# Patient Record
Sex: MALE
[De-identification: names, ages, dates, MRNs, and addresses within clinical notes are randomized per-mention and may not be internally consistent; named-entity substitution may affect disease eponyms.]

---

## 2017-03-24 ENCOUNTER — HOSPITAL (OUTPATIENT)
Dept: OTHER | Age: 57
End: 2017-03-24
Attending: PODIATRIST

## 2017-03-24 ENCOUNTER — CHARTING TRANS (OUTPATIENT)
Dept: OTHER | Age: 57
End: 2017-03-24

## 2017-03-24 LAB — GLUCOSE BLDC GLUCOMTR-MCNC: 134 MG/DL (ref 65–99)

## 2017-04-01 ENCOUNTER — HOSPITAL (OUTPATIENT)
Dept: OTHER | Age: 57
End: 2017-04-01
Attending: PODIATRIST

## 2017-04-26 LAB — GLUCOSE BLDC GLUCOMTR-MCNC: 199 MG/DL (ref 65–99)

## 2017-05-01 ENCOUNTER — HOSPITAL (OUTPATIENT)
Dept: OTHER | Age: 57
End: 2017-05-01
Attending: PODIATRIST

## 2017-05-22 ENCOUNTER — HOSPITAL ENCOUNTER (INPATIENT)
Facility: HOSPITAL | Age: 57
LOS: 4 days | Discharge: HOME HEALTH | DRG: 485 | End: 2017-05-26
Attending: EMERGENCY MEDICINE | Admitting: INTERNAL MEDICINE
Payer: COMMERCIAL

## 2017-05-22 ENCOUNTER — APPOINTMENT (OUTPATIENT)
Dept: GENERAL RADIOLOGY | Facility: HOSPITAL | Age: 57
DRG: 485 | End: 2017-05-22
Attending: EMERGENCY MEDICINE
Payer: COMMERCIAL

## 2017-05-22 DIAGNOSIS — L03.116 LEFT LEG CELLULITIS: Primary | ICD-10-CM

## 2017-05-22 DIAGNOSIS — E11.65 TYPE 2 DIABETES MELLITUS WITH HYPERGLYCEMIA, UNSPECIFIED LONG TERM INSULIN USE STATUS: ICD-10-CM

## 2017-05-22 DIAGNOSIS — N28.9 RENAL INSUFFICIENCY: ICD-10-CM

## 2017-05-22 PROCEDURE — 93005 ELECTROCARDIOGRAM TRACING: CPT

## 2017-05-22 PROCEDURE — 87086 URINE CULTURE/COLONY COUNT: CPT | Performed by: EMERGENCY MEDICINE

## 2017-05-22 PROCEDURE — 87040 BLOOD CULTURE FOR BACTERIA: CPT | Performed by: EMERGENCY MEDICINE

## 2017-05-22 PROCEDURE — 99285 EMERGENCY DEPT VISIT HI MDM: CPT

## 2017-05-22 PROCEDURE — 81001 URINALYSIS AUTO W/SCOPE: CPT | Performed by: EMERGENCY MEDICINE

## 2017-05-22 PROCEDURE — 36415 COLL VENOUS BLD VENIPUNCTURE: CPT

## 2017-05-22 PROCEDURE — 83690 ASSAY OF LIPASE: CPT | Performed by: EMERGENCY MEDICINE

## 2017-05-22 PROCEDURE — 71010 XR CHEST AP PORTABLE  (CPT=71010): CPT | Performed by: EMERGENCY MEDICINE

## 2017-05-22 PROCEDURE — 87070 CULTURE OTHR SPECIMN AEROBIC: CPT | Performed by: ORTHOPAEDIC SURGERY

## 2017-05-22 PROCEDURE — 93010 ELECTROCARDIOGRAM REPORT: CPT

## 2017-05-22 PROCEDURE — 87205 SMEAR GRAM STAIN: CPT | Performed by: ORTHOPAEDIC SURGERY

## 2017-05-22 PROCEDURE — 80053 COMPREHEN METABOLIC PANEL: CPT | Performed by: EMERGENCY MEDICINE

## 2017-05-22 PROCEDURE — 82962 GLUCOSE BLOOD TEST: CPT

## 2017-05-22 PROCEDURE — 87150 DNA/RNA AMPLIFIED PROBE: CPT | Performed by: EMERGENCY MEDICINE

## 2017-05-22 PROCEDURE — 73562 X-RAY EXAM OF KNEE 3: CPT | Performed by: EMERGENCY MEDICINE

## 2017-05-22 PROCEDURE — 85025 COMPLETE CBC W/AUTO DIFF WBC: CPT | Performed by: EMERGENCY MEDICINE

## 2017-05-22 PROCEDURE — 96361 HYDRATE IV INFUSION ADD-ON: CPT

## 2017-05-22 PROCEDURE — 94660 CPAP INITIATION&MGMT: CPT

## 2017-05-22 PROCEDURE — 87184 SC STD DISK METHOD PER PLATE: CPT | Performed by: EMERGENCY MEDICINE

## 2017-05-22 PROCEDURE — 5A09357 ASSISTANCE WITH RESPIRATORY VENTILATION, LESS THAN 24 CONSECUTIVE HOURS, CONTINUOUS POSITIVE AIRWAY PRESSURE: ICD-10-PCS | Performed by: HOSPITALIST

## 2017-05-22 PROCEDURE — 87184 SC STD DISK METHOD PER PLATE: CPT | Performed by: ORTHOPAEDIC SURGERY

## 2017-05-22 PROCEDURE — 96374 THER/PROPH/DIAG INJ IV PUSH: CPT

## 2017-05-22 PROCEDURE — 87147 CULTURE TYPE IMMUNOLOGIC: CPT | Performed by: EMERGENCY MEDICINE

## 2017-05-22 PROCEDURE — 87186 SC STD MICRODIL/AGAR DIL: CPT | Performed by: ORTHOPAEDIC SURGERY

## 2017-05-22 PROCEDURE — 89050 BODY FLUID CELL COUNT: CPT | Performed by: ORTHOPAEDIC SURGERY

## 2017-05-22 PROCEDURE — 87147 CULTURE TYPE IMMUNOLOGIC: CPT | Performed by: ORTHOPAEDIC SURGERY

## 2017-05-22 PROCEDURE — 87186 SC STD MICRODIL/AGAR DIL: CPT | Performed by: EMERGENCY MEDICINE

## 2017-05-22 PROCEDURE — 0S9D3ZX DRAINAGE OF LEFT KNEE JOINT, PERCUTANEOUS APPROACH, DIAGNOSTIC: ICD-10-PCS | Performed by: ORTHOPAEDIC SURGERY

## 2017-05-22 PROCEDURE — 89051 BODY FLUID CELL COUNT: CPT | Performed by: ORTHOPAEDIC SURGERY

## 2017-05-22 PROCEDURE — 89060 EXAM SYNOVIAL FLUID CRYSTALS: CPT | Performed by: ORTHOPAEDIC SURGERY

## 2017-05-22 PROCEDURE — 83036 HEMOGLOBIN GLYCOSYLATED A1C: CPT | Performed by: INTERNAL MEDICINE

## 2017-05-22 RX ORDER — ATORVASTATIN CALCIUM 20 MG/1
20 TABLET, FILM COATED ORAL DAILY
Status: DISCONTINUED | OUTPATIENT
Start: 2017-05-22 | End: 2017-05-26

## 2017-05-22 RX ORDER — ACETAMINOPHEN 500 MG
1000 TABLET ORAL ONCE
Status: COMPLETED | OUTPATIENT
Start: 2017-05-22 | End: 2017-05-22

## 2017-05-22 RX ORDER — HYDROMORPHONE HYDROCHLORIDE 1 MG/ML
1 INJECTION, SOLUTION INTRAMUSCULAR; INTRAVENOUS; SUBCUTANEOUS EVERY 30 MIN PRN
Status: COMPLETED | OUTPATIENT
Start: 2017-05-22 | End: 2017-05-23

## 2017-05-22 RX ORDER — ONDANSETRON 2 MG/ML
4 INJECTION INTRAMUSCULAR; INTRAVENOUS
Status: DISCONTINUED | OUTPATIENT
Start: 2017-05-22 | End: 2017-05-24 | Stop reason: DRUGHIGH

## 2017-05-22 RX ORDER — ACETAMINOPHEN 325 MG/1
650 TABLET ORAL EVERY 6 HOURS PRN
Status: DISCONTINUED | OUTPATIENT
Start: 2017-05-22 | End: 2017-05-25

## 2017-05-22 RX ORDER — HYDROMORPHONE HYDROCHLORIDE 1 MG/ML
0.5 INJECTION, SOLUTION INTRAMUSCULAR; INTRAVENOUS; SUBCUTANEOUS EVERY 30 MIN PRN
Status: DISCONTINUED | OUTPATIENT
Start: 2017-05-22 | End: 2017-05-22 | Stop reason: ALTCHOICE

## 2017-05-22 RX ORDER — DEXTROSE MONOHYDRATE 25 G/50ML
50 INJECTION, SOLUTION INTRAVENOUS
Status: DISCONTINUED | OUTPATIENT
Start: 2017-05-22 | End: 2017-05-26

## 2017-05-22 RX ORDER — HYDROCODONE BITARTRATE AND ACETAMINOPHEN 5; 325 MG/1; MG/1
1 TABLET ORAL EVERY 6 HOURS PRN
Status: DISCONTINUED | OUTPATIENT
Start: 2017-05-22 | End: 2017-05-26

## 2017-05-22 RX ORDER — ONDANSETRON 2 MG/ML
4 INJECTION INTRAMUSCULAR; INTRAVENOUS EVERY 4 HOURS PRN
Status: DISCONTINUED | OUTPATIENT
Start: 2017-05-22 | End: 2017-05-22 | Stop reason: ALTCHOICE

## 2017-05-22 NOTE — ED PROVIDER NOTES
Patient Seen in: [facility] Emergency Department    History   Patient presents with:  Lower Extremity Injury (musculoskeletal)  Abdomen/Flank Pain (GI/)  Nausea/Vomiting/Diarrhea (gastrointestinal)    Stated Complaint: knee pain and abd pain    HP Emulsion 0.05 % External Foam,  Apply bid to rash areas. Avoid face and scalp. Insulin Glargine (LANTUS SOLOSTAR) 100 UNIT/ML Subcutaneous Solution Pen-injector,  Inject 35 Units into the skin daily.      Aspirin (ASPIR-81 OR),  Take 1 tablet by mouth da Temp src 05/22/17 1332 Temporal   SpO2 05/22/17 1332 95 %   O2 Device 05/22/17 1332 None (Room air)       Current:/63 mmHg  Pulse 106  Temp(Src) 99.9 °F (37.7 °C) (Oral)  Resp 24  Ht 198.1 cm (6' 6\")  Wt 204. 119 kg  BMI 52.01 kg/m2  SpO2 96% components within normal limits   CBC W/ DIFFERENTIAL - Abnormal; Notable for the following:     HGB 12.1 (*)     HCT 36.9 (*)     MCV 79.0 (*)     MCH 25.9 (*)     Neutrophil Absolute Prelim 10.07 (*)     Neutrophil Absolute 10.07 (*)     Lymphocyte Absol with Tylenol. Blood cultures ordered. I discussed case with infectious disease, Dr. Haylie Mendoza. She recommends Rocephin and vancomycin be started after joint aspirate obtained  I discussed case with orthopedics, Dr. Royce Sen.   He will begin later to perfor

## 2017-05-22 NOTE — ED INITIAL ASSESSMENT (HPI)
sts that he woke this AM with left knee pain. Noted swelling. sts that he cannot walk on it.   Also co abd pain with N/V/D x 2 days

## 2017-05-22 NOTE — ED NOTES
Reddened, hot area below l knee into shin, pt denies injury, worsening over past 2-3 days, pt c/o 9/10 pain to the area, cms intact, no n/t, +l pedal pulse

## 2017-05-22 NOTE — H&P
[facility] Hospitalist History and Physical      Patient presents with:  Lower Extremity Injury (musculoskeletal)  Abdomen/Flank Pain (GI/)  Nausea/Vomiting/Diarrhea (gastrointestinal)       PCP: Alfrieda Cranker, MD       History of Present Illness: Patient is a 61 Glargine (LANTUS SOLOSTAR) 100 UNIT/ML Subcutaneous Solution Pen-injector Inject 35 Units into the skin daily. NOVOTWIST 32G X 5 MM Does not apply Misc USE 2 TIMES DAILY   Aspirin (ASPIR-81 OR) Take 1 tablet by mouth daily.    TOPICORT SPRAY 0.25 % Appl Regular rate and rhythm, S1, S2 normal, no murmur, rub or gallop appreciated   Abdomen:   Soft, non-tender. Bowel sounds normal. No masses,  No organomegaly. Non distended   Extremities: Extremities normal, atraumatic, no cyanosis or edema.    Skin: Skin co 5/22/2017 at 14:42     Approved by: Richard Rothman MD            Xr Chest Ap Portable  (cpt=71010)    5/22/2017  PROCEDURE:  XR CHEST AP PORTABLE (CPT=71010)  TECHNIQUE:  AP chest radiograph was obtained. COMPARISON:  None.   INDICATIONS:  knee pain and

## 2017-05-23 ENCOUNTER — APPOINTMENT (OUTPATIENT)
Dept: CV DIAGNOSTICS | Facility: HOSPITAL | Age: 57
DRG: 485 | End: 2017-05-23
Attending: INTERNAL MEDICINE
Payer: COMMERCIAL

## 2017-05-23 PROCEDURE — 82565 ASSAY OF CREATININE: CPT | Performed by: INTERNAL MEDICINE

## 2017-05-23 PROCEDURE — 93306 TTE W/DOPPLER COMPLETE: CPT | Performed by: INTERNAL MEDICINE

## 2017-05-23 PROCEDURE — 84520 ASSAY OF UREA NITROGEN: CPT | Performed by: INTERNAL MEDICINE

## 2017-05-23 PROCEDURE — 82962 GLUCOSE BLOOD TEST: CPT

## 2017-05-23 PROCEDURE — 97530 THERAPEUTIC ACTIVITIES: CPT

## 2017-05-23 PROCEDURE — 97162 PT EVAL MOD COMPLEX 30 MIN: CPT

## 2017-05-23 PROCEDURE — 87040 BLOOD CULTURE FOR BACTERIA: CPT | Performed by: INTERNAL MEDICINE

## 2017-05-23 RX ORDER — CALCIUM CARBONATE 200(500)MG
500 TABLET,CHEWABLE ORAL
Status: DISCONTINUED | OUTPATIENT
Start: 2017-05-23 | End: 2017-05-26

## 2017-05-23 NOTE — RESPIRATORY THERAPY NOTE
SCARLET - Equipment Use Daily Summary:                  . Set Mode: AUTO CPAP WITH C-FLEX                . Usage in hours: 7.2                . 90% Pressure (EPAP) level: 13                . 90% Insp. Pressure (IPAP): Slater August AHI: 0.3                .

## 2017-05-23 NOTE — HOME CARE LIAISON
Referral received for [facility]. Referral placed via [facility]. [facility] has accepted pt and will provide skilled nurse and physical therapy. Summary:  Results for Issac Franks (MRN [id number])   Ref.  Range 5/22/2017 14:16   HEMOGLOBIN A1c Latest Ref Range: <5.7

## 2017-05-23 NOTE — PAYOR COMM NOTE
Attending Physician: Daphnie Hurd MD    Review Type: ADMISSION   Reviewer: Janey Naik       Date: May 23, 2017 - 2:48 PM  Payor: ANTELMO MELINDA  Authorization Number: [id number]  Admit date: 5/22/2017  1:35 PM   Admitted from Emergency Dept.: yes CefTRIAXone Sodium (ROCEPHIN) 2 g in sodium chloride 0.9 % 100 mL IVPB-minibag     Date Action Dose Route User    5/22/2017 2043 New Bag 2 g Intravenous Sierra Youngblood RN      HYDROcodone-acetaminophen (NORCO) 5-325 MG per tab 1 tablet     Date Action Joel Thakur RN          RESULTS LAST 24HRS:  Labs Reviewed   COMP METABOLIC PANEL (14) - Abnormal; Notable for the following:     Glucose 181 (*)     Creatinine 1.50 (*)     GFR 51 (*)     Albumin 3.2 (*)     All other components within normal limits for the following: Body Fluid Culture Result   (*)     Value: Moderate Streptococcus agalactiae (Group B beta strep)    All other components within normal limits   CBC W/ DIFFERENTIAL - Abnormal; Notable for the following:     HGB 12.1 (*)     HCT 36. 9 salvage reasonable  Long term ivabx >=6 weeks, followed by several months/years oral supression    PCN G 4 mil U q4h  DC vanc-ceftriaxone  Repeat blood cx for clearance/ECHO  I/D per ortho    PICC line in a couple days if repeat cx cleared  Discussed the p

## 2017-05-23 NOTE — CM/SW NOTE
05/23/17 1500   CM/SW Referral Data   Referral Source    Reason for Referral Discharge planning   Informant Patient   Pertinent Medical Hx   Primary Care Physician Name Errol Cushing   Patient Info   Patient's Mental Status Alert;Oriented   Pa

## 2017-05-23 NOTE — PROGRESS NOTES
[facility] Hospitalist Progress Note                                                                   [facility]    Beni Mckay  3/25/1960    SUBJECTIVE:  Febrile to 102.1 knee pain persists.       OBJECTI ([address]      Acute septic arthritis L knee, prosthetic knee  - w associated cellulitis  - plan possible I and D, IV abx as per ID, will need long term IV abx  - cheching ECHO, repeat BC    DM2  - cont home regimen    SCARLET  - cpap    Morbid obesity  - needs on

## 2017-05-23 NOTE — PROGRESS NOTES
[facility] dosing service    Initial Pharmacokinetic Consult for Vancomycin Dosing     Beni Mckay is a 61year old male admitted on 5/22 who is being treated for cellulitis. Pharmacy has been asked to dose Vancomycin by Dr. Jose Root.     He has No Kn We appreciate the opportunity to assist in his care.     Doug Shen, PharmD  5/22/2017  8:44 PM  [address] [place] [address]: [phone]

## 2017-05-23 NOTE — CONSULTS
[facility]    PATIENT'S NAME: Hammad Allen   ATTENDING PHYSICIAN: Delfin Agosto M.D.   CONSULTING PHYSICIAN: Carlos Aguilar M.D.    PATIENT ACCOUNT#:   [de-identified]    LOCATION:  [facility]  MEDICAL RECORD #:   [id number]       DATE OF BIR view.  No evidence of gross loosening. IMPRESSION:    1. Left knee pain with history of total knee arthroplasty done elsewhere, rule out septic joint. 2.   Patella subluxation, possible ligamentous issues at the left total knee.     PLAN:  Discussed

## 2017-05-23 NOTE — PHYSICAL THERAPY NOTE
PHYSICAL THERAPY EVALUATION - INPATIENT     Room Number: 385/385-A  Evaluation Date: 5/23/2017  Type of Evaluation: Initial  Physician Order: PT Eval and Treat    Presenting Problem: intractable L knee pain  Reason for Therapy: Mobility Dysfunction and lower extremity                PAIN ASSESSMENT  Ratin  Location: L knee  Management Techniques: Activity promotion; Body mechanics; Relaxation;Breathing techniques;Repositioning    COGNITION  · Overall Cognitive Status:  WFL - within functional limits supine. Updated pt on role of PT, POC, DC planning/recs, positioning.,activity.        Exercise/Education Provided:  Bed mobility  Functional activity tolerated  Transfer training    Patient End of Session: In bed;Needs met;Call light within reach;RN aware supervision     Goal #3 Patient is able to ambulate 150 feet with assist device: walker - rolling at assistance level: supervision     Goal #4 Assess stairs as appropriate    Goal #5    Goal #6    Goal Comments: Goals established on 5/23/2017

## 2017-05-24 ENCOUNTER — SURGERY (OUTPATIENT)
Age: 57
End: 2017-05-24

## 2017-05-24 ENCOUNTER — ANESTHESIA EVENT (OUTPATIENT)
Dept: SURGERY | Facility: HOSPITAL | Age: 57
DRG: 485 | End: 2017-05-24
Payer: COMMERCIAL

## 2017-05-24 ENCOUNTER — ANESTHESIA (OUTPATIENT)
Dept: SURGERY | Facility: HOSPITAL | Age: 57
DRG: 485 | End: 2017-05-24
Payer: COMMERCIAL

## 2017-05-24 PROCEDURE — 80048 BASIC METABOLIC PNL TOTAL CA: CPT | Performed by: INTERNAL MEDICINE

## 2017-05-24 PROCEDURE — 87206 SMEAR FLUORESCENT/ACID STAI: CPT | Performed by: ORTHOPAEDIC SURGERY

## 2017-05-24 PROCEDURE — 0SUW09Z SUPPLEMENT LEFT KNEE JOINT, TIBIAL SURFACE WITH LINER, OPEN APPROACH: ICD-10-PCS | Performed by: ORTHOPAEDIC SURGERY

## 2017-05-24 PROCEDURE — 82962 GLUCOSE BLOOD TEST: CPT

## 2017-05-24 PROCEDURE — 0SPD09Z REMOVAL OF LINER FROM LEFT KNEE JOINT, OPEN APPROACH: ICD-10-PCS | Performed by: ORTHOPAEDIC SURGERY

## 2017-05-24 PROCEDURE — 87102 FUNGUS ISOLATION CULTURE: CPT | Performed by: ORTHOPAEDIC SURGERY

## 2017-05-24 PROCEDURE — 97530 THERAPEUTIC ACTIVITIES: CPT

## 2017-05-24 PROCEDURE — 87116 MYCOBACTERIA CULTURE: CPT | Performed by: ORTHOPAEDIC SURGERY

## 2017-05-24 PROCEDURE — 87040 BLOOD CULTURE FOR BACTERIA: CPT | Performed by: INTERNAL MEDICINE

## 2017-05-24 PROCEDURE — 0SBD0ZZ EXCISION OF LEFT KNEE JOINT, OPEN APPROACH: ICD-10-PCS | Performed by: ORTHOPAEDIC SURGERY

## 2017-05-24 PROCEDURE — 0S9D00Z DRAINAGE OF LEFT KNEE JOINT WITH DRAINAGE DEVICE, OPEN APPROACH: ICD-10-PCS | Performed by: ORTHOPAEDIC SURGERY

## 2017-05-24 PROCEDURE — 87070 CULTURE OTHR SPECIMN AEROBIC: CPT | Performed by: ORTHOPAEDIC SURGERY

## 2017-05-24 PROCEDURE — 85025 COMPLETE CBC W/AUTO DIFF WBC: CPT | Performed by: INTERNAL MEDICINE

## 2017-05-24 PROCEDURE — 87205 SMEAR GRAM STAIN: CPT | Performed by: ORTHOPAEDIC SURGERY

## 2017-05-24 PROCEDURE — 87147 CULTURE TYPE IMMUNOLOGIC: CPT | Performed by: ORTHOPAEDIC SURGERY

## 2017-05-24 PROCEDURE — 87075 CULTR BACTERIA EXCEPT BLOOD: CPT | Performed by: ORTHOPAEDIC SURGERY

## 2017-05-24 PROCEDURE — 97167 OT EVAL HIGH COMPLEX 60 MIN: CPT

## 2017-05-24 DEVICE — IMPLANTABLE DEVICE: Type: IMPLANTABLE DEVICE | Site: KNEE | Status: FUNCTIONAL

## 2017-05-24 RX ORDER — HYDROMORPHONE HYDROCHLORIDE 1 MG/ML
0.2 INJECTION, SOLUTION INTRAMUSCULAR; INTRAVENOUS; SUBCUTANEOUS EVERY 2 HOUR PRN
Status: DISCONTINUED | OUTPATIENT
Start: 2017-05-24 | End: 2017-05-26

## 2017-05-24 RX ORDER — KETOROLAC TROMETHAMINE 30 MG/ML
30 INJECTION, SOLUTION INTRAMUSCULAR; INTRAVENOUS EVERY 6 HOURS
Status: DISCONTINUED | OUTPATIENT
Start: 2017-05-24 | End: 2017-05-25

## 2017-05-24 RX ORDER — SODIUM CHLORIDE, SODIUM LACTATE, POTASSIUM CHLORIDE, CALCIUM CHLORIDE 600; 310; 30; 20 MG/100ML; MG/100ML; MG/100ML; MG/100ML
INJECTION, SOLUTION INTRAVENOUS CONTINUOUS
Status: DISCONTINUED | OUTPATIENT
Start: 2017-05-24 | End: 2017-05-26

## 2017-05-24 RX ORDER — NALOXONE HYDROCHLORIDE 0.4 MG/ML
80 INJECTION, SOLUTION INTRAMUSCULAR; INTRAVENOUS; SUBCUTANEOUS AS NEEDED
Status: DISCONTINUED | OUTPATIENT
Start: 2017-05-24 | End: 2017-05-24 | Stop reason: HOSPADM

## 2017-05-24 RX ORDER — HYDROCODONE BITARTRATE AND ACETAMINOPHEN 10; 325 MG/1; MG/1
2 TABLET ORAL AS NEEDED
Status: DISCONTINUED | OUTPATIENT
Start: 2017-05-24 | End: 2017-05-24 | Stop reason: HOSPADM

## 2017-05-24 RX ORDER — ACETAMINOPHEN 325 MG/1
650 TABLET ORAL 4 TIMES DAILY
Status: DISPENSED | OUTPATIENT
Start: 2017-05-24 | End: 2017-05-26

## 2017-05-24 RX ORDER — DOCUSATE SODIUM 100 MG/1
100 CAPSULE, LIQUID FILLED ORAL 2 TIMES DAILY
Status: DISCONTINUED | OUTPATIENT
Start: 2017-05-24 | End: 2017-05-26

## 2017-05-24 RX ORDER — METOCLOPRAMIDE HYDROCHLORIDE 5 MG/ML
10 INJECTION INTRAMUSCULAR; INTRAVENOUS EVERY 6 HOURS PRN
Status: DISCONTINUED | OUTPATIENT
Start: 2017-05-24 | End: 2017-05-26

## 2017-05-24 RX ORDER — OXYCODONE HYDROCHLORIDE 10 MG/1
10 TABLET ORAL EVERY 4 HOURS PRN
Status: DISPENSED | OUTPATIENT
Start: 2017-05-24 | End: 2017-05-26

## 2017-05-24 RX ORDER — OXYCODONE HYDROCHLORIDE 5 MG/1
5 TABLET ORAL EVERY 4 HOURS PRN
Status: ACTIVE | OUTPATIENT
Start: 2017-05-24 | End: 2017-05-26

## 2017-05-24 RX ORDER — METOCLOPRAMIDE HYDROCHLORIDE 5 MG/ML
10 INJECTION INTRAMUSCULAR; INTRAVENOUS AS NEEDED
Status: DISCONTINUED | OUTPATIENT
Start: 2017-05-24 | End: 2017-05-24 | Stop reason: HOSPADM

## 2017-05-24 RX ORDER — ONDANSETRON 2 MG/ML
4 INJECTION INTRAMUSCULAR; INTRAVENOUS AS NEEDED
Status: DISCONTINUED | OUTPATIENT
Start: 2017-05-24 | End: 2017-05-24 | Stop reason: HOSPADM

## 2017-05-24 RX ORDER — HYDROMORPHONE HYDROCHLORIDE 1 MG/ML
0.4 INJECTION, SOLUTION INTRAMUSCULAR; INTRAVENOUS; SUBCUTANEOUS EVERY 5 MIN PRN
Status: DISCONTINUED | OUTPATIENT
Start: 2017-05-24 | End: 2017-05-24 | Stop reason: HOSPADM

## 2017-05-24 RX ORDER — POLYETHYLENE GLYCOL 3350 17 G/17G
17 POWDER, FOR SOLUTION ORAL DAILY PRN
Status: DISCONTINUED | OUTPATIENT
Start: 2017-05-24 | End: 2017-05-26

## 2017-05-24 RX ORDER — DEXTROSE MONOHYDRATE 25 G/50ML
50 INJECTION, SOLUTION INTRAVENOUS
Status: DISCONTINUED | OUTPATIENT
Start: 2017-05-24 | End: 2017-05-24 | Stop reason: HOSPADM

## 2017-05-24 RX ORDER — HYDROMORPHONE HYDROCHLORIDE 1 MG/ML
0.8 INJECTION, SOLUTION INTRAMUSCULAR; INTRAVENOUS; SUBCUTANEOUS EVERY 2 HOUR PRN
Status: DISCONTINUED | OUTPATIENT
Start: 2017-05-24 | End: 2017-05-26

## 2017-05-24 RX ORDER — BISACODYL 10 MG
10 SUPPOSITORY, RECTAL RECTAL
Status: DISCONTINUED | OUTPATIENT
Start: 2017-05-24 | End: 2017-05-26

## 2017-05-24 RX ORDER — OXYCODONE HCL 10 MG/1
10 TABLET, FILM COATED, EXTENDED RELEASE ORAL
Status: DISPENSED | OUTPATIENT
Start: 2017-05-24 | End: 2017-05-26

## 2017-05-24 RX ORDER — ZOLPIDEM TARTRATE 5 MG/1
5 TABLET ORAL NIGHTLY PRN
Status: DISCONTINUED | OUTPATIENT
Start: 2017-05-24 | End: 2017-05-26

## 2017-05-24 RX ORDER — SODIUM PHOSPHATE, DIBASIC AND SODIUM PHOSPHATE, MONOBASIC 7; 19 G/133ML; G/133ML
1 ENEMA RECTAL ONCE AS NEEDED
Status: DISCONTINUED | OUTPATIENT
Start: 2017-05-24 | End: 2017-05-26

## 2017-05-24 RX ORDER — HYDROMORPHONE HYDROCHLORIDE 1 MG/ML
0.4 INJECTION, SOLUTION INTRAMUSCULAR; INTRAVENOUS; SUBCUTANEOUS EVERY 2 HOUR PRN
Status: DISCONTINUED | OUTPATIENT
Start: 2017-05-24 | End: 2017-05-26

## 2017-05-24 RX ORDER — HYDROMORPHONE HYDROCHLORIDE 1 MG/ML
INJECTION, SOLUTION INTRAMUSCULAR; INTRAVENOUS; SUBCUTANEOUS
Status: COMPLETED
Start: 2017-05-24 | End: 2017-05-24

## 2017-05-24 RX ORDER — ONDANSETRON 2 MG/ML
4 INJECTION INTRAMUSCULAR; INTRAVENOUS EVERY 6 HOURS PRN
Status: DISCONTINUED | OUTPATIENT
Start: 2017-05-24 | End: 2017-05-26

## 2017-05-24 RX ORDER — CYCLOBENZAPRINE HCL 5 MG
5 TABLET ORAL 3 TIMES DAILY PRN
Status: DISCONTINUED | OUTPATIENT
Start: 2017-05-24 | End: 2017-05-26

## 2017-05-24 RX ORDER — OXYCODONE HYDROCHLORIDE 15 MG/1
15 TABLET ORAL EVERY 4 HOURS PRN
Status: ACTIVE | OUTPATIENT
Start: 2017-05-24 | End: 2017-05-26

## 2017-05-24 RX ORDER — HYDROCODONE BITARTRATE AND ACETAMINOPHEN 10; 325 MG/1; MG/1
1 TABLET ORAL AS NEEDED
Status: DISCONTINUED | OUTPATIENT
Start: 2017-05-24 | End: 2017-05-24 | Stop reason: HOSPADM

## 2017-05-24 NOTE — ANESTHESIA PREPROCEDURE EVALUATION
PRE-OP EVALUATION    Patient Name: Beni Mckay    Pre-op Diagnosis: LEFT LEG CELLULITIS    Procedure(s):  IRRIGTATION AND DEBRIDEMENT, POLY EXCHANGE LEFT TOTAL KNEE ARTHROPLASTY      Surgeon(s) and Role:     * Trey Saha MD - Primary    Pre-op [MAR Hold] insulin aspart (NOVOLOG) 100 UNIT/ML flexpen 4-20 Units 4-20 Units Subcutaneous TID CC and HS   [MAR Hold] insulin aspart (NOVOLOG) 100 UNIT/ML flexpen 1-68 Units 1-68 Units Subcutaneous TID CC   [DISCONTINUED] CefTRIAXone Sodium (ROCEPHIN) 2 hypertension   (+) hyperlipidemia                                  Endo/Other      (+) diabetes                            Pulmonary                    (+) sleep apnea       Neuro/Psych                                        Past Surgical History    HERNIA other                Present on Admission:   **None**

## 2017-05-24 NOTE — RESPIRATORY THERAPY NOTE
SCARLET - Equipment Use Daily Summary:  · Set Mode CPAP  · Usage in hours: 10  · 90% Pressure (EPAP) level:   · 90% Insp Pressure (IPAP): 13  · AHI: 0  · Supplemental Oxygen:  · Comments:

## 2017-05-24 NOTE — PLAN OF CARE
DISCHARGE PLANNING    • Discharge to home or other facility with appropriate resources Progressing        Diabetes/Glucose Control    • Glucose maintained within prescribed range Progressing        Impaired Functional Mobility    • Achieve highest/safest l

## 2017-05-24 NOTE — BRIEF OP NOTE
Pre-Operative Diagnosis: LEFT SEPTIC TKA     Post-Operative Diagnosis: SAME     Procedure Performed:   Procedure(s):  IRRIGTATION AND DEBRIDEMENT, POLY EXCHANGE LEFT SEPTIC TOTAL KNEE ARTHROPLASTY      Surgeon(s) and Role:     * MD Divine Coughlin P

## 2017-05-24 NOTE — PLAN OF CARE
Patient/Family Goals    • Patient/Family Short Term Goal Not Progressing        RISK FOR INFECTION - ADULT    • Absence of fever/infection during anticipated neutropenic period Not Progressing          Diabetes/Glucose Control    • Glucose maintained withi

## 2017-05-24 NOTE — PLAN OF CARE
Temp =103.2, spoke w/ Dr. Dereje Torres, paged Dr. Aureliano Reno to see if I&D of rt knee can be done earlier, Called back by Manisha), will notify Dr. Aureliano Reno, will update family when plan is determined.

## 2017-05-24 NOTE — OCCUPATIONAL THERAPY NOTE
OCCUPATIONAL THERAPY QUICK EVALUATION - INPATIENT    Room Number: 385/385-A  Evaluation Date: 5/24/2017     Type of Evaluation: Quick Eval  Presenting Problem: intractable L knee pain    Physician Order: IP Consult to Occupational Therapy  Reason for JOHN FRANCOIS [facility] risk    WEIGHT BEARING RESTRICTION  Weight Bearing Restriction: None      PAIN ASSESSMENT  Ratin  Location: L knee. Management Techniques:  Activity promotion;Repositioning    COGNITION  No issues noted     RANGE OF MOTION AND STRENGTH ASSESSMENT  Uppe Bed mobility and transfers at [address] [place] [address]. Functional ambulation for approx 5 ft w/RW at [facility] level, max time needed for completion and max verbal cues and encouragement.        Patient End of Session: Up in chair;Needs met;Call light within reach;RN aware

## 2017-05-24 NOTE — PROGRESS NOTES
[facility] Hospitalist Progress Note                                                                   [facility]    Beni Mckay  3/25/1960    SUBJECTIVE: recurrent fevers.   More swelling    OBJECTIVE:  Te cellulitis  Active Problems:    Renal insufficiency    Type 2 diabetes mellitus with hyperglycemia, unspecified long term insulin use status (HCC)      Acute septic arthritis L knee, prosthetic knee  - w associated cellulitis  - plan possible I and D, IV a

## 2017-05-24 NOTE — PHYSICAL THERAPY NOTE
PHYSICAL THERAPY TREATMENT NOTE - INPATIENT    Room Number: 385/385-A     Session: 1   Number of Visits to Meet Established Goals: 5    Presenting Problem: intractable L knee pain    Problem List  Principal Problem:    Left leg cellulitis  Active Problems on back to sitting on the side of the bed?: A Lot   How much help from another person does the patient currently need. ..   -   Moving to and from a bed to a chair (including a wheelchair)?: A Little   -   Need to walk in hospital room?: A [address] in independent bed mobility, transfers and gait. The patient is below baseline and would benefit from skilled inpatient PT to address the above deficits to assist patient in returning to prior level of function.  DC planning is undetermined and based upon

## 2017-05-24 NOTE — PROGRESS NOTES
[facility]                INFECTIOUS DISEASE PROGRESS NOTE    Beni Mckay Patient Status:  Inpatient    3/25/1960 MRN [id number]   [facility] 3SW-A Attending Daphnie Hurd MD   Hosp Day # 2 PCP Maye Browne MD     An (Group B beta strep)       Blood Culture Smear Gram positive cocci in chains        Strep agalactiae by PCR      Blood Culture FREQ X 2 [[id number]] Collected: 05/22/17 1416      Order Status: Completed Lab Status: Preliminary result Updated: 05/23/17 1600 Left knee replacement 2015  -acute (late) septic arthritis prosthetic joint infection /bacteremia/LLE cellulitis  Group B strep agalactiae  -Continue PCN G 24milU/day div q4h  Arthroscopic debridement/liner exchange today per ortho  PICC line 5/25 if no ne

## 2017-05-24 NOTE — PROGRESS NOTES
Off unit to OR. Eyeglasses left in room. [facility] bed  here, and will switch beds while patient is in surgery.

## 2017-05-25 PROBLEM — Z99.89 OSA ON CPAP: Status: ACTIVE | Noted: 2017-05-25

## 2017-05-25 PROBLEM — G47.33 OSA ON CPAP: Status: ACTIVE | Noted: 2017-05-25

## 2017-05-25 PROCEDURE — 82565 ASSAY OF CREATININE: CPT | Performed by: INTERNAL MEDICINE

## 2017-05-25 PROCEDURE — 82962 GLUCOSE BLOOD TEST: CPT

## 2017-05-25 PROCEDURE — 36569 INSJ PICC 5 YR+ W/O IMAGING: CPT

## 2017-05-25 PROCEDURE — 84520 ASSAY OF UREA NITROGEN: CPT | Performed by: INTERNAL MEDICINE

## 2017-05-25 PROCEDURE — 02HV33Z INSERTION OF INFUSION DEVICE INTO SUPERIOR VENA CAVA, PERCUTANEOUS APPROACH: ICD-10-PCS | Performed by: HOSPITALIST

## 2017-05-25 PROCEDURE — B548ZZA ULTRASONOGRAPHY OF SUPERIOR VENA CAVA, GUIDANCE: ICD-10-PCS | Performed by: HOSPITALIST

## 2017-05-25 PROCEDURE — 97164 PT RE-EVAL EST PLAN CARE: CPT

## 2017-05-25 PROCEDURE — 76937 US GUIDE VASCULAR ACCESS: CPT

## 2017-05-25 PROCEDURE — 97116 GAIT TRAINING THERAPY: CPT

## 2017-05-25 RX ORDER — HYDROCODONE BITARTRATE AND ACETAMINOPHEN 10; 325 MG/1; MG/1
1 TABLET ORAL EVERY 4 HOURS PRN
Status: DISCONTINUED | OUTPATIENT
Start: 2017-05-26 | End: 2017-05-26

## 2017-05-25 RX ORDER — ACETAMINOPHEN 325 MG/1
650 TABLET ORAL EVERY 6 HOURS PRN
Status: ACTIVE | OUTPATIENT
Start: 2017-05-25 | End: 2017-05-26

## 2017-05-25 RX ORDER — DIPHENHYDRAMINE HCL 25 MG
25 CAPSULE ORAL EVERY 6 HOURS PRN
Status: DISCONTINUED | OUTPATIENT
Start: 2017-05-25 | End: 2017-05-26

## 2017-05-25 RX ORDER — HYDROCODONE BITARTRATE AND ACETAMINOPHEN 10; 325 MG/1; MG/1
2 TABLET ORAL EVERY 4 HOURS PRN
Status: DISCONTINUED | OUTPATIENT
Start: 2017-05-26 | End: 2017-05-26

## 2017-05-25 RX ORDER — SODIUM CHLORIDE 0.9 % (FLUSH) 0.9 %
10 SYRINGE (ML) INJECTION AS NEEDED
Status: DISCONTINUED | OUTPATIENT
Start: 2017-05-25 | End: 2017-05-26

## 2017-05-25 NOTE — PROGRESS NOTES
[facility]                INFECTIOUS DISEASE PROGRESS NOTE    Beni Mckay Patient Status:  Inpatient    3/25/1960 MRN [id number]   [facility] 3SW-A Attending Gil Ribeiro MD   Hosp Day # 3 PCP Jase Chavez MD     An Specimen Information: Blood from Blood,peripheral       Blood Culture Result         Result:        Streptococcus agalactiae (Group B beta strep)       Blood Culture Smear Gram positive cocci in chains        Strep agalactiae by PCR       Blood Culture OBDULIO diabetes ([facility] [address]     Left leg cellulitis     Renal insufficiency     Type 2 diabetes mellitus with hyperglycemia, unspecified long term insulin use status (HCC)     SCARLET on CPAP      ASSESSMENT/PLAN:  1. SP Left knee replacement 2015  Presenting with acute la

## 2017-05-25 NOTE — PHYSICAL THERAPY NOTE
PHYSICAL THERAPY KNEE EVALUATION - INPATIENT     Room Number: 385/385-A  Evaluation Date: 5/25/2017  Type of Evaluation: Re-evaluation  Physician Order: PT Eval and Treat    Presenting Problem: s/p I and D, poly exchange L TKA  Reason for Therapy: Mobility limits    RANGE OF MOTION AND STRENGTH ASSESSMENT  Upper extremity ROM and strength are within functional limits     Lower extremity ROM is within functional limits Left Knee extension 1/2 ROM  Left Knee flexion 1/2 ROM    Lower extremity strength is withi Exercise/Education Provided:  Bed mobility  Functional activity tolerated  Gait training  Transfer training    Patient End of Session: Up in chair;Needs met;Call light within reach;RN aware of session/findings; All patient questions and concerns addre device and supervision    Goal #5    AROM 0 degrees extension to 95 degrees flexion      Goal #6        Goal Comments: Goals established on 5/25/2017

## 2017-05-25 NOTE — PROGRESS NOTES
[facility]  Progress Note    Beni Mckay Patient Status:  Inpatient    3/25/1960 MRN [id number]   [facility] 3SW-A Attending Han Freeman MD   Hosp Day # 3 PCP Alejandro Dacosta MD     SUBJECTIVE:  INTERVAL HISTORY: S/P  3  P 7.69*   WBC  9.9   PLT  170.0      No results for input(s): PTP, INR in the last 72 hours. ASSESSMENT/PLAN:  1. Continue pain managemen  2. ID for Atb orders  3. Continue DVT prophylaxis   4. Continue PT/OT  5.  Discharge plannin [address]

## 2017-05-25 NOTE — PROGRESS NOTES
Post Op Day 1 Ortho Note    Status Post Nerve Block:  Type of Nerve Block: Left adductor canal I&D and TKA  Single Injection Nerve Block    Post op review: No evidence of immediate block related complications, No paresthesia noted, Able to lift leg(s), Abl

## 2017-05-25 NOTE — OPERATIVE REPORT
[facility]    PATIENT'S NAME: Martha Haley   ATTENDING PHYSICIAN: Astrid Wise M.D. OPERATING PHYSICIAN: Roverto Thomas M.D.    PATIENT ACCOUNT#:   [de-identified]    LOCATION:  PACU [facility] PACU 2 [address] [facility] #:   [id number]       DATE tendon appeared to be somewhat deficient which could have been related to failure of the repair of the prior procedure. There were Ethibond sutures evident at the prior incision site.   Upon entering the joint, abundant purulent fluid was produced which wa stable condition.     Dictated By Sofiya Pappas M.D.  d: 05/24/2017 18:55:50  t: 05/24/2017 20:10:44  Job [id number]  TERE/

## 2017-05-25 NOTE — RESPIRATORY THERAPY NOTE
SACRLET : EQUIPMENT USE: DAILY SUMMARY                                            SET MODE: AUTO CPAP WITH CFLEX                                          USAGE IN HOURS: 9:50                                          90

## 2017-05-25 NOTE — PROGRESS NOTES
Patient on CPAP, O2 sats dropping to 83% on pulse ox with a good pleth. Paged RT, will be up as soon as they are able to check machine. RN applied adapter for oxygen to CPAP, bleeding in 4-5L of O2 at this time. Sats improved to 94% with CPAP and 5L.

## 2017-05-25 NOTE — PROGRESS NOTES
Patient arrived on unit s/p I&D of left knee. No pain medication orders at this time. Called and spoke with Klaudia STEINBERG for [facility] orthopedics.  PA agreeable to start patient on postop Joint pain protocol, consult placed for acute pain service to manage georgi

## 2017-05-25 NOTE — ANESTHESIA POSTPROCEDURE EVALUATION
[facility]    Beni Mckay Patient Status:  Inpatient   Age/Gender 61year old male MRN [id number]   [facility] SURGERY Attending Cherelle Castaneda MD   [facility] Day # 2 PCP Frank Perry MD       Anesthesia Post-op Note    Procedure(

## 2017-05-25 NOTE — PROGRESS NOTES
[facility] Hospitalist Progress Note                                                                   [facility]    Beni Mckay  3/25/1960    SUBJECTIVE: sp I and D yesterday. Pain has improved.   No feve aspart  1-68 Units Subcutaneous TID CC     Continuous Infusions:   • lactated ringers 100 mL/hr at 05/24/17 1955     PRN: diphenhydrAMINE, [START ON 5/26/2017] HYDROcodone-acetaminophen **OR** [START ON 5/26/2017] HYDROcodone-acetaminophen, acetaminophen,

## 2017-05-26 VITALS
BODY MASS INDEX: 36.45 KG/M2 | RESPIRATION RATE: 16 BRPM | WEIGHT: 315 LBS | TEMPERATURE: 98 F | OXYGEN SATURATION: 96 % | SYSTOLIC BLOOD PRESSURE: 161 MMHG | HEIGHT: 78 IN | HEART RATE: 93 BPM | DIASTOLIC BLOOD PRESSURE: 72 MMHG

## 2017-05-26 PROBLEM — Z47.89 ORTHOPEDIC AFTERCARE: Status: ACTIVE | Noted: 2017-05-26

## 2017-05-26 PROCEDURE — 82962 GLUCOSE BLOOD TEST: CPT

## 2017-05-26 PROCEDURE — 80048 BASIC METABOLIC PNL TOTAL CA: CPT | Performed by: INTERNAL MEDICINE

## 2017-05-26 PROCEDURE — 97168 OT RE-EVAL EST PLAN CARE: CPT

## 2017-05-26 PROCEDURE — 97116 GAIT TRAINING THERAPY: CPT

## 2017-05-26 PROCEDURE — 97530 THERAPEUTIC ACTIVITIES: CPT

## 2017-05-26 PROCEDURE — 97535 SELF CARE MNGMENT TRAINING: CPT

## 2017-05-26 RX ORDER — HYDROCODONE BITARTRATE AND ACETAMINOPHEN 10; 325 MG/1; MG/1
1-2 TABLET ORAL EVERY 4 HOURS PRN
Qty: 40 TABLET | Refills: 0 | Status: SHIPPED | OUTPATIENT
Start: 2017-05-26 | End: 2017-05-31

## 2017-05-26 RX ORDER — PSEUDOEPHEDRINE HCL 30 MG
100 TABLET ORAL 2 TIMES DAILY
Qty: 30 CAPSULE | Refills: 0 | Status: SHIPPED | OUTPATIENT
Start: 2017-05-26 | End: 2020-03-20 | Stop reason: DRUGHIGH

## 2017-05-26 NOTE — PROGRESS NOTES
[facility]                INFECTIOUS DISEASE PROGRESS NOTE    Beni Mckay Patient Status:  Inpatient    3/25/1960 MRN [id number]   [facility] 3SW-A Attending Waldemar Moreno MD   [facility] Day # 4 PCP Valentín Echeverria MD     An Collected: 05/23/17 0711     Order Status: Completed Lab Status: Preliminary result Updated: 05/26/17 0800     Specimen Information: Blood from Blood,peripheral       Blood Culture Result No Growth 3 Days      Blood Culture Once [[id number]] Collected: 05/2 [facility]  [phone]

## 2017-05-26 NOTE — CM/SW NOTE
Update sent to [facility] [phone] for home IVAB/supplies. [facility]uz [facility] [phone] re: [facility] with start of care for Sat, 5/27.

## 2017-05-26 NOTE — CM/SW NOTE
Call from Carmen Kemp, care coordinator with [employer]. She would like a call with notification of pts discharge date. Her contact info is [phone].

## 2017-05-26 NOTE — PROGRESS NOTES
[facility]  Progress Note    Beni Mckay Patient Status:  Inpatient    3/25/1960 MRN [id number]   [facility] 3SW-A Attending Bibiana Martines, [address] [facility] Day # 4 PCP Errol Cushing, MD     SUBJECTIVE:  INTERVAL HISTORY: S/P  4  Procedur

## 2017-05-26 NOTE — OCCUPATIONAL THERAPY NOTE
OCCUPATIONAL THERAPY EVALUATION - INPATIENT     Room Number: 385/385-A  Evaluation Date: 5/26/2017  Type of Evaluation: Re-evaluation  Presenting Problem: L knee pain, s/p I&D and poly exchange    Physician Order: IP Consult to Occupational Therapy  Reason independence with ADL and ambulation without AD prior to admit. Pt works full time in IT.  Spouse will be home to assist over the weekend, pt's mother will stay with him during the day starting Monday but will be unable to provide physical assist.       SUB Scale): 38.66  CMS Modifier (G-Code): CK    FUNCTIONAL TRANSFER ASSESSMENT  Supine to Sit : Supervision  Sit to Stand: Minimum assistance    Skilled Therapy Provided: Educated pt on techniques to safely complete bed mobility; pt able to return demo.  Pt com Discharge Recommendations: Home with home health PT/OT  OT Device Recommendations:  (hip kit, toilet safety frame)    PLAN  OT Treatment Plan: Balance activities; ADL training;Functional transfer training;UE strengthening/ROM; Endurance training;Patient/Fami

## 2017-05-26 NOTE — PROGRESS NOTES
Pt discharged home at this time with all belongings. Scripts for xarelto, norco, colace, IV penicillin G given to pt. Pt taken via w/c to lobby. Pt's wife to take pt home.

## 2017-05-26 NOTE — RESPIRATORY THERAPY NOTE
SCARLET - Equipment Use Daily Summary:                  . Set Mode: AUTO CPAP WITH C-FLEX                . Usage in hours: 10                . 90% Pressure (EPAP) level: 14                . 90% Insp. Pressure (IPAP): Marcus Downing AHI: 1.1                .  Nohemy Simmons

## 2017-05-26 NOTE — PHYSICAL THERAPY NOTE
PHYSICAL THERAPY TREATMENT NOTE - INPATIENT    Room Number: 385/385-A     Session: 1   Number of Visits to Meet Established Goals: 5    Presenting Problem: s/p I and D, poly exchange L TKA     Problem List  Principal Problem:    Left leg cellulitis  Activ bedside commode, etc.): None   -   Moving from lying on back to sitting on the side of the bed?: None   How much help from another person does the patient currently need. ..   -   Moving to and from a bed to a chair (including a wheelchair)?: None   -   Chani Medeiros patient presents with the following impairments: decreased AROM/strength, decreased balance, gait dysfunction. Pt's AM-PAC score demonstrating 28.96% functional impairment.    These impairments and comorbidities manifest themselves as functional limitation

## 2017-05-26 NOTE — DISCHARGE SUMMARY
General Medicine Discharge Summary     Patient ID:  Beni Mckay  61year old  3/25/1960    Admit date: 5/22/2017    Discharge date and time: 5/26/17    Attending Physician: Nette Nash DO     Prim IP CONSULT TO ORTHOPEDIC SURGERY  IP CONSULT TO INFECTIOUS DISEASE  IP CONSULT TO HOSPITALIST  IP CONSULT TO RESPIRATORY CARE  IP CONSULT TO OCCUPATIONAL THERAPY  IP CONSULT TO SOCIAL WORK  IP CONSULT TO OCCUPATIONAL THERAPY  IP CONSULT TO CASE MANAGEMENT Noted swelling. sts that he cannot walk on it. Also co abd pain with N/V/D x 2 days    FINDINGS:  Cardiomegaly. Low inspiratory volumes. Elevation of the right hemidiaphragm. No pleural effusions. No pneumothorax. Atheromatous changes of the aorta. daily    CYRUS MICROLET LANCETS Does not apply Misc  Check 4 times daily    !! NOVOTWIST 32G X 5 MM Does not apply Misc  USE 2 TIMES DAILY    !! Insulin Pen Needle (BD PEN NEEDLE SHORT U/F) 31G X 8 MM Does not apply Misc  Test twice daily    ! ! - Potential

## 2017-05-26 NOTE — PROGRESS NOTES
Acute Pain Service    Post Op Day 2 Ortho Note    Assessed patient in bed. Patient rates pain 1-2/10. Patient states Aldo Ordoñez is working well to manage pain; denies itching/nausea/dizziness. Patient able to bear weight on sx leg; equal sensation in BLE.  No

## 2017-05-30 NOTE — CM/SW NOTE
05/30/17 0800   Discharge disposition   Discharged to: Home-Health   Name of Susan LongGregoria Pio Sinclair 1 services after discharge [facility] for iv abx and supplies)   Discharge transportation Private car   [place] 5/26/17

## 2017-06-05 ENCOUNTER — LAB REQUISITION (OUTPATIENT)
Dept: LAB | Facility: HOSPITAL | Age: 57
End: 2017-06-05
Payer: COMMERCIAL

## 2017-06-05 DIAGNOSIS — R78.81 BACTEREMIA: ICD-10-CM

## 2017-06-05 DIAGNOSIS — T84.54XA INFECTION AND INFLAMMATORY REACTION DUE TO INTERNAL LEFT KNEE PROSTHESIS, INITIAL ENCOUNTER (HCC): ICD-10-CM

## 2017-06-05 PROCEDURE — 86140 C-REACTIVE PROTEIN: CPT | Performed by: INTERNAL MEDICINE

## 2017-06-05 PROCEDURE — 80053 COMPREHEN METABOLIC PANEL: CPT | Performed by: INTERNAL MEDICINE

## 2017-06-05 PROCEDURE — 85025 COMPLETE CBC W/AUTO DIFF WBC: CPT | Performed by: INTERNAL MEDICINE

## 2017-06-07 PROCEDURE — 82570 ASSAY OF URINE CREATININE: CPT | Performed by: INTERNAL MEDICINE

## 2017-06-07 PROCEDURE — 82043 UR ALBUMIN QUANTITATIVE: CPT | Performed by: INTERNAL MEDICINE

## 2017-06-08 PROBLEM — E11.65 TYPE 2 DIABETES MELLITUS WITH HYPERGLYCEMIA, UNSPECIFIED LONG TERM INSULIN USE STATUS: Status: RESOLVED | Noted: 2017-05-22 | Resolved: 2017-06-08

## 2017-06-12 ENCOUNTER — LAB REQUISITION (OUTPATIENT)
Dept: LAB | Facility: HOSPITAL | Age: 57
End: 2017-06-12
Attending: INTERNAL MEDICINE
Payer: COMMERCIAL

## 2017-06-12 DIAGNOSIS — R78.81 BACTEREMIA: ICD-10-CM

## 2017-06-12 DIAGNOSIS — T84.54XA INFECTION AND INFLAMMATORY REACTION DUE TO INTERNAL LEFT KNEE PROSTHESIS, INITIAL ENCOUNTER (HCC): ICD-10-CM

## 2017-06-12 PROCEDURE — 85025 COMPLETE CBC W/AUTO DIFF WBC: CPT | Performed by: INTERNAL MEDICINE

## 2017-06-12 PROCEDURE — 86140 C-REACTIVE PROTEIN: CPT | Performed by: INTERNAL MEDICINE

## 2017-06-12 PROCEDURE — 80053 COMPREHEN METABOLIC PANEL: CPT | Performed by: INTERNAL MEDICINE

## 2017-06-13 ENCOUNTER — HOSPITAL ENCOUNTER (INPATIENT)
Facility: HOSPITAL | Age: 57
LOS: 3 days | Discharge: HOME | DRG: 811 | End: 2017-06-17
Attending: EMERGENCY MEDICINE | Admitting: INTERNAL MEDICINE
Payer: COMMERCIAL

## 2017-06-13 DIAGNOSIS — E66.01 MORBID OBESITY, UNSPECIFIED OBESITY TYPE (HCC): ICD-10-CM

## 2017-06-13 DIAGNOSIS — R06.00 DYSPNEA, UNSPECIFIED TYPE: ICD-10-CM

## 2017-06-13 DIAGNOSIS — G47.33 OSA (OBSTRUCTIVE SLEEP APNEA): ICD-10-CM

## 2017-06-13 DIAGNOSIS — Z98.890 S/P LEFT KNEE SURGERY: ICD-10-CM

## 2017-06-13 DIAGNOSIS — R73.9 HYPERGLYCEMIA: ICD-10-CM

## 2017-06-13 DIAGNOSIS — D64.9 ANEMIA, UNSPECIFIED TYPE: Primary | ICD-10-CM

## 2017-06-13 DIAGNOSIS — E11.8 TYPE 2 DIABETES MELLITUS WITH COMPLICATION, WITHOUT LONG-TERM CURRENT USE OF INSULIN (HCC): ICD-10-CM

## 2017-06-13 DIAGNOSIS — A49.02 MRSA INFECTION: ICD-10-CM

## 2017-06-13 PROCEDURE — 36415 COLL VENOUS BLD VENIPUNCTURE: CPT | Performed by: EMERGENCY MEDICINE

## 2017-06-13 PROCEDURE — 93005 ELECTROCARDIOGRAM TRACING: CPT

## 2017-06-13 PROCEDURE — 99285 EMERGENCY DEPT VISIT HI MDM: CPT | Performed by: EMERGENCY MEDICINE

## 2017-06-13 PROCEDURE — 93010 ELECTROCARDIOGRAM REPORT: CPT | Performed by: EMERGENCY MEDICINE

## 2017-06-14 ENCOUNTER — APPOINTMENT (OUTPATIENT)
Dept: GENERAL RADIOLOGY | Facility: HOSPITAL | Age: 57
DRG: 811 | End: 2017-06-14
Attending: EMERGENCY MEDICINE
Payer: COMMERCIAL

## 2017-06-14 ENCOUNTER — APPOINTMENT (OUTPATIENT)
Dept: CT IMAGING | Facility: HOSPITAL | Age: 57
DRG: 811 | End: 2017-06-14
Attending: EMERGENCY MEDICINE
Payer: COMMERCIAL

## 2017-06-14 PROBLEM — E11.8 TYPE 2 DIABETES MELLITUS WITH COMPLICATION, WITHOUT LONG-TERM CURRENT USE OF INSULIN (HCC): Status: ACTIVE | Noted: 2017-06-14

## 2017-06-14 PROBLEM — D64.9 ANEMIA: Status: ACTIVE | Noted: 2017-06-14

## 2017-06-14 PROBLEM — R06.00 DYSPNEA, UNSPECIFIED TYPE: Status: ACTIVE | Noted: 2017-06-14

## 2017-06-14 PROBLEM — A49.02 MRSA INFECTION: Status: ACTIVE | Noted: 2017-06-14

## 2017-06-14 PROBLEM — E66.01 MORBID OBESITY, UNSPECIFIED OBESITY TYPE (HCC): Status: ACTIVE | Noted: 2017-06-14

## 2017-06-14 PROBLEM — Z98.890 S/P LEFT KNEE SURGERY: Status: ACTIVE | Noted: 2017-06-14

## 2017-06-14 PROBLEM — D64.9 ANEMIA, UNSPECIFIED TYPE: Status: ACTIVE | Noted: 2017-06-14

## 2017-06-14 PROBLEM — R73.9 HYPERGLYCEMIA: Status: ACTIVE | Noted: 2017-06-14

## 2017-06-14 PROCEDURE — 71275 CT ANGIOGRAPHY CHEST: CPT | Performed by: EMERGENCY MEDICINE

## 2017-06-14 PROCEDURE — 5A09457 ASSISTANCE WITH RESPIRATORY VENTILATION, 24-96 CONSECUTIVE HOURS, CONTINUOUS POSITIVE AIRWAY PRESSURE: ICD-10-PCS | Performed by: INTERNAL MEDICINE

## 2017-06-14 PROCEDURE — 84484 ASSAY OF TROPONIN QUANT: CPT | Performed by: INTERNAL MEDICINE

## 2017-06-14 PROCEDURE — 86901 BLOOD TYPING SEROLOGIC RH(D): CPT | Performed by: EMERGENCY MEDICINE

## 2017-06-14 PROCEDURE — 86920 COMPATIBILITY TEST SPIN: CPT

## 2017-06-14 PROCEDURE — 83880 ASSAY OF NATRIURETIC PEPTIDE: CPT | Performed by: EMERGENCY MEDICINE

## 2017-06-14 PROCEDURE — 86850 RBC ANTIBODY SCREEN: CPT | Performed by: EMERGENCY MEDICINE

## 2017-06-14 PROCEDURE — 36430 TRANSFUSION BLD/BLD COMPNT: CPT

## 2017-06-14 PROCEDURE — 80053 COMPREHEN METABOLIC PANEL: CPT | Performed by: EMERGENCY MEDICINE

## 2017-06-14 PROCEDURE — 85025 COMPLETE CBC W/AUTO DIFF WBC: CPT | Performed by: EMERGENCY MEDICINE

## 2017-06-14 PROCEDURE — 82962 GLUCOSE BLOOD TEST: CPT

## 2017-06-14 PROCEDURE — 83036 HEMOGLOBIN GLYCOSYLATED A1C: CPT | Performed by: INTERNAL MEDICINE

## 2017-06-14 PROCEDURE — 85730 THROMBOPLASTIN TIME PARTIAL: CPT | Performed by: EMERGENCY MEDICINE

## 2017-06-14 PROCEDURE — 71010 XR CHEST AP PORTABLE  (CPT=71010): CPT | Performed by: EMERGENCY MEDICINE

## 2017-06-14 PROCEDURE — 82272 OCCULT BLD FECES 1-3 TESTS: CPT | Performed by: INTERNAL MEDICINE

## 2017-06-14 PROCEDURE — 85018 HEMOGLOBIN: CPT | Performed by: INTERNAL MEDICINE

## 2017-06-14 PROCEDURE — 30233N1 TRANSFUSION OF NONAUTOLOGOUS RED BLOOD CELLS INTO PERIPHERAL VEIN, PERCUTANEOUS APPROACH: ICD-10-PCS | Performed by: INTERNAL MEDICINE

## 2017-06-14 PROCEDURE — 84484 ASSAY OF TROPONIN QUANT: CPT | Performed by: EMERGENCY MEDICINE

## 2017-06-14 PROCEDURE — 86900 BLOOD TYPING SEROLOGIC ABO: CPT | Performed by: EMERGENCY MEDICINE

## 2017-06-14 PROCEDURE — 85025 COMPLETE CBC W/AUTO DIFF WBC: CPT | Performed by: INTERNAL MEDICINE

## 2017-06-14 PROCEDURE — 85610 PROTHROMBIN TIME: CPT | Performed by: EMERGENCY MEDICINE

## 2017-06-14 PROCEDURE — 94660 CPAP INITIATION&MGMT: CPT

## 2017-06-14 RX ORDER — POTASSIUM CHLORIDE 20 MEQ/1
40 TABLET, EXTENDED RELEASE ORAL ONCE
Status: COMPLETED | OUTPATIENT
Start: 2017-06-14 | End: 2017-06-14

## 2017-06-14 RX ORDER — ONDANSETRON 2 MG/ML
4 INJECTION INTRAMUSCULAR; INTRAVENOUS EVERY 4 HOURS PRN
Status: DISCONTINUED | OUTPATIENT
Start: 2017-06-14 | End: 2017-06-15

## 2017-06-14 RX ORDER — ACETAMINOPHEN 325 MG/1
650 TABLET ORAL EVERY 6 HOURS PRN
Status: DISCONTINUED | OUTPATIENT
Start: 2017-06-14 | End: 2017-06-17

## 2017-06-14 RX ORDER — ATORVASTATIN CALCIUM 20 MG/1
20 TABLET, FILM COATED ORAL DAILY
Status: DISCONTINUED | OUTPATIENT
Start: 2017-06-14 | End: 2017-06-17

## 2017-06-14 RX ORDER — HYDROMORPHONE HYDROCHLORIDE 1 MG/ML
0.5 INJECTION, SOLUTION INTRAMUSCULAR; INTRAVENOUS; SUBCUTANEOUS EVERY 30 MIN PRN
Status: ACTIVE | OUTPATIENT
Start: 2017-06-14 | End: 2017-06-14

## 2017-06-14 RX ORDER — DEXTROSE MONOHYDRATE 25 G/50ML
50 INJECTION, SOLUTION INTRAVENOUS
Status: DISCONTINUED | OUTPATIENT
Start: 2017-06-14 | End: 2017-06-17

## 2017-06-14 RX ORDER — SODIUM CHLORIDE 9 MG/ML
INJECTION, SOLUTION INTRAVENOUS ONCE
Status: DISCONTINUED | OUTPATIENT
Start: 2017-06-14 | End: 2017-06-17

## 2017-06-14 RX ORDER — HYDROCODONE BITARTRATE AND ACETAMINOPHEN 10; 325 MG/1; MG/1
1-2 TABLET ORAL EVERY 4 HOURS PRN
Status: DISCONTINUED | OUTPATIENT
Start: 2017-06-14 | End: 2017-06-17

## 2017-06-14 NOTE — ED NOTES
Assumed care, pt resting in bed. States breathing feels better.  Pt awaiting CT, pt and family updated of the POC

## 2017-06-14 NOTE — CM/SW NOTE
06/14/17 1500   CM/SW Referral Data   Referral Source Social Work (self-referral)   Reason for Referral Discharge planning;Readmission;Psychoscial assessment   Informant Patient   Readmission Assessment   Factors that patient feels contributed to this r self-referred to meet w/ pt due to case finding and diagnosis. SW also met w/ pt for a psychosocial assessment and to discuss any discharge needs. Pt lives in a 2 level house w/ supportive wife and 24 y/o son.  Pt is independent w/ all ADL's and works f

## 2017-06-14 NOTE — ED INITIAL ASSESSMENT (HPI)
Pt reports lethargy and SOB. States he had left knee surgery 3 wks ago. Had PT today noticed symptoms then. Went downstairs of his home tonight and was unable to get back upstairs. Staph infection to surgical site. Had PICC line.

## 2017-06-14 NOTE — ED PROVIDER NOTES
Patient Seen in: [facility] Emergency Department    History   Patient presents with:  Dyspnea PAVITHRA SOB (respiratory)    Stated Complaint:     HPI    62-year-old male who has a history of a recent staph infection to a left knee prosthesis, status post units SOLR 4 Million Units,  Inject 4 Million Units into the vein every 4 (four) hours. rivaroxaban 10 MG Oral Tab,  Take 1 tablet (10 mg total) by mouth daily. docusate sodium 100 MG Oral Cap,  Take 100 mg by mouth 2 (two) times daily.    Triamcinolone systems reviewed and negative except as noted above. PSFH elements reviewed from today and agreed except as otherwise stated in HPI.     Physical Exam       ED Triage Vitals   BP 06/13/17 2356 129/69 mmHg   Pulse 06/13/17 2356 115   Resp 06/13/17 2356 22 INR 1.33 (*)     All other components within normal limits   POCT GLUCOSE - Abnormal; Notable for the following:     POC Glucose 289 (*)     All other components within normal limits   CBC W/ DIFFERENTIAL - Abnormal; Notable for the following:     RBC 3 Report. Rate: 116  Rhythm: Sinus tachycardia. Left axis deviation. Right bundle branch block. Rate, axis, intervals are noted.   I agree with computer interpretation  Reading: see above          Patient was brought back to the examination room immediate infection  Hyperglycemia    Disposition:  Admit    Follow-up:  No follow-up provider specified.     Medications Prescribed:  Current Discharge Medication List        Present on Admission  Date Reviewed: 6/9/2017          ICD-10-CM Noted POA    Anemia D64.9

## 2017-06-14 NOTE — H&P
[facility] Hospitalist History and Physical      Patient presents with:  Dyspnea PAVITHRA SOB (respiratory)       PCP: Jocelin Ma MD      History of Present Illness: Patient is a 61year old male with PMH sig for DM2, morbid obesity, HL, SCARLET, and recent septic arthr • Obesity Brother    • Diabetes Maternal Aunt        Review of Systems   CONSTITUTIONAL:  As per HPI. HEENT:  Eyes:  No diplopia or blurred vision. ENT:  No earache, sore throat or runny nose.   CARDIOVASCULAR:  No chest pain  RESPIRATORY:  No cough, + 139 06/14/2017   K 3.8 06/14/2017    06/14/2017   CO2 22.0 06/14/2017    06/14/2017   CA 8.2 06/14/2017   ALB 2.5 06/14/2017   ALKPHO 75 06/14/2017   BILT 0.3 06/14/2017   TP 7.3 06/14/2017   AST 33 06/14/2017   ALT 75 06/14/2017   PTT 33.7 06 is transmitted to the [facility] ([facility]) [address]) which includes the Dose Index Registry. PATIENT STATED HISTORY:(As transcribed by Technologist)  Lethargy and shortness of breathe.  Tachycrdia, status post k Patchy airspace disease in the left costophrenic angle could represent atelectasis and/or consolidation, with small left pleural effusion not excluded. Clinical correlation recommended.    A preliminary report was provided by the [facility] hospitalist to continue to follow patient while in house  A total of 70 minutes taken with patient and coordinating care. Greater than 50% face to face encounter.           Addendum:  -Post tranfusion Hg lower, transfuse another unit, Hg q8hr  -consult GI

## 2017-06-14 NOTE — PAYOR COMM NOTE
--------------  ADMISSION REVIEW     Payor: [facility]  Authorization Number: N/A    Admit date: 6/13/2017 11:56 PM       Admitting Physician: Tong Sanon MD  Attending Physician:  Tong Sanon MD  Primary Care Physician: Liliana Hou MD Empagliflozin (JARDIANCE) 25 MG Oral Tab,  Take 25 mg by mouth daily. MetFORMIN HCl 1000 MG Oral Tab,  TAKE 1 BY MOUTH TWICE DAILY   HYDROcodone-acetaminophen  MG Oral Tab,  Take 1-2 tablets by mouth every 4 (four) hours as needed.    sodium chlorid reviewed and negative except as noted above. PSFH elements reviewed from today and agreed except as otherwise stated in HPI.     Physical Exam     ED Triage Vitals   BP 06/13/17 2356 129/69 mmHg   Pulse 06/13/17 2356 115   Resp 06/13/17 2356 22   Temp 06 All other components within normal limits   POCT GLUCOSE - Abnormal; Notable for the following:     POC Glucose 289 (*)     All other components within normal limits   CBC W/ DIFFERENTIAL - Abnormal; Notable for the following:     RBC 3.30 (*)     HGB 8.3 department. Patient remained stable throughout the emergency department observation period. Patient will be transfused 1 unit of packed red blood cells to see if this alleviates his tachycardia and his dyspnea.   Patient will be admitted to telemetry [facility]

## 2017-06-14 NOTE — ED NOTES
IV to the R AC gauge 20 started by  via [address]

## 2017-06-14 NOTE — ED NOTES
1st unit PRBC transfusion started, verified with Karon Kiser RN. Will monitor. Pt states breathing still better, \"I'm just laying here so it's all good. \"

## 2017-06-14 NOTE — PROGRESS NOTES
NURSING ADMISSION NOTE      Patient admitted via Cart  Oriented to room. Safety precautions initiated. Bed in low position.   Call light in reach.    ---------------------------------------------------------------  Admissions navigator completed  Xiomy

## 2017-06-14 NOTE — PLAN OF CARE
Patient/Family Goals    • Patient/Family Long Term Goal Progressing    • Patient/Family Short Term Goal Progressing        RESPIRATORY - ADULT    • Achieves optimal ventilation and oxygenation Progressing          Assumed care around 0730. A&Ox4.  Pt denies

## 2017-06-15 ENCOUNTER — ANESTHESIA EVENT (OUTPATIENT)
Dept: ENDOSCOPY | Facility: HOSPITAL | Age: 57
DRG: 811 | End: 2017-06-15
Payer: COMMERCIAL

## 2017-06-15 PROCEDURE — 82607 VITAMIN B-12: CPT | Performed by: INTERNAL MEDICINE

## 2017-06-15 PROCEDURE — 85025 COMPLETE CBC W/AUTO DIFF WBC: CPT | Performed by: INTERNAL MEDICINE

## 2017-06-15 PROCEDURE — 97110 THERAPEUTIC EXERCISES: CPT

## 2017-06-15 PROCEDURE — 83550 IRON BINDING TEST: CPT | Performed by: INTERNAL MEDICINE

## 2017-06-15 PROCEDURE — 82962 GLUCOSE BLOOD TEST: CPT

## 2017-06-15 PROCEDURE — 85018 HEMOGLOBIN: CPT | Performed by: INTERNAL MEDICINE

## 2017-06-15 PROCEDURE — 84132 ASSAY OF SERUM POTASSIUM: CPT | Performed by: INTERNAL MEDICINE

## 2017-06-15 PROCEDURE — 83540 ASSAY OF IRON: CPT | Performed by: INTERNAL MEDICINE

## 2017-06-15 PROCEDURE — 82746 ASSAY OF FOLIC ACID SERUM: CPT | Performed by: INTERNAL MEDICINE

## 2017-06-15 PROCEDURE — 97162 PT EVAL MOD COMPLEX 30 MIN: CPT

## 2017-06-15 RX ORDER — PANTOPRAZOLE SODIUM 40 MG/1
40 TABLET, DELAYED RELEASE ORAL
Status: DISCONTINUED | OUTPATIENT
Start: 2017-06-16 | End: 2017-06-17

## 2017-06-15 RX ORDER — POTASSIUM CHLORIDE 20 MEQ/1
40 TABLET, EXTENDED RELEASE ORAL ONCE
Status: COMPLETED | OUTPATIENT
Start: 2017-06-15 | End: 2017-06-15

## 2017-06-15 NOTE — PROGRESS NOTES
Pt hgb at 0500 7.6. Called and talked to Dr. Cr Baptiste. Decided to wait to see what next lab draw shows, scheduled for 0100. Pt is asymptomatic. Resting comfortably.

## 2017-06-15 NOTE — PROGRESS NOTES
[facility] Hospitalist Progress Note                                                                   [facility]    Beni Mckay  3/25/1960    CC: f/u anemia    SUBJECTIVE:    States he still feels [facility] Continuous Infusions:    PRN: polyethylene glycol, ondansetron HCl, HYDROcodone-acetaminophen, glucose **OR** Glucose-Vitamin C **OR** dextrose 50% **OR** glucose **OR** Glucose-Vitamin C, acetaminophen    Assessment/Plan:  Principal Problem:    Anemia

## 2017-06-15 NOTE — ANESTHESIA PREPROCEDURE EVALUATION
PRE-OP EVALUATION    Patient Name: Beni Mckay    Pre-op Diagnosis: Anemia, unspecified type [D64.9]    Procedure(s):  ESOPHAGOGASTRODUODENOSCOPY, COLONOSCOPY  COLONOSCOPY    Surgeon(s) and Role:     Enrique Moreno MD - Primary    Pre-op vitals 180 tablet Rfl: 1   Empagliflozin (JARDIANCE) 25 MG Oral Tab Take 25 mg by mouth daily.  Disp: 90 tablet Rfl: 1   MetFORMIN HCl 1000 MG Oral Tab TAKE 1 BY MOUTH TWICE DAILY Disp: 180 tablet Rfl: 1   HYDROcodone-acetaminophen  MG Oral Tab Take 1-2 tabl CRI                   Cardiovascular                (+) obesity     (+) hyperlipidemia                                  Endo/Other      (+) diabetes  type 2, using insulin      (+) anemia            (+) arthritis       Pulmonary                    (+) slee exam normal.         Dental    No notable dental history.          Pulmonary    Pulmonary exam normal.                 Other findings            ASA: 3   Plan: MAC  NPO status verified and     Post-procedure pain management plan discussed with surgeon and p

## 2017-06-15 NOTE — PROGRESS NOTES
Received patient at 0730  Patient A/O x 4  Hgb 7.8 on redraw  Plan for EGD and colonoscopy in the morning  Bowel prep starting at 1700  Consent signed for both procedures  IV PCN Q4hrs  Accuchecks/ Sliding scale  NPO after MN  CLD today  Will continue to m

## 2017-06-15 NOTE — PHYSICAL THERAPY NOTE
PHYSICAL THERAPY EVALUATION - INPATIENT     Room Number: 4210/3804-F  Evaluation Date: 6/15/2017  Type of Evaluation: Initial  Physician Order: PT Eval and Treat    Presenting Problem: anemia  Reason for Therapy: Mobility Dysfunction and Discharge Plan Level of [place]: at baseline Pt is indep ambulator, works in IT. Following L knee surgery in May 2017 Pt had progressed to ambulating with crutches, however day before current admit began using RW again due to weakness. Was receiving HHPT 3x/week. CK    FUNCTIONAL ABILITY STATUS  Gait Assessment   Gait Assistance: Dependent assistance (per FIM definitions; actually CGA)  Distance (ft): 15  Assistive Device: Rolling walker  Pattern:  (slow deanna)          Skilled Therapy Provided: Performed supine restoring strength, endurance, and ROM. Pt verbalized understanding. DISCHARGE RECOMMENDATIONS  PT Discharge Recommendations: Home with home health PT    PLAN  PT Treatment Plan: Endurance; Patient education;Gait training;Strengthening;Stair training;Tra

## 2017-06-15 NOTE — RESPIRATORY THERAPY NOTE
SCARLET : EQUIPMENT USE: DAILY SUMMARY                                            SET MODE: AUTO CPAP WITH CFLEX                                          USAGE IN HOURS: 3:25                                          90

## 2017-06-16 ENCOUNTER — SURGERY (OUTPATIENT)
Age: 57
End: 2017-06-16

## 2017-06-16 ENCOUNTER — ANESTHESIA (OUTPATIENT)
Dept: ENDOSCOPY | Facility: HOSPITAL | Age: 57
DRG: 811 | End: 2017-06-16
Payer: COMMERCIAL

## 2017-06-16 PROCEDURE — 84132 ASSAY OF SERUM POTASSIUM: CPT | Performed by: INTERNAL MEDICINE

## 2017-06-16 PROCEDURE — 97116 GAIT TRAINING THERAPY: CPT

## 2017-06-16 PROCEDURE — 0DBE8ZX EXCISION OF LARGE INTESTINE, VIA NATURAL OR ARTIFICIAL OPENING ENDOSCOPIC, DIAGNOSTIC: ICD-10-PCS | Performed by: INTERNAL MEDICINE

## 2017-06-16 PROCEDURE — 85018 HEMOGLOBIN: CPT | Performed by: INTERNAL MEDICINE

## 2017-06-16 PROCEDURE — C9113 INJ PANTOPRAZOLE SODIUM, VIA: HCPCS | Performed by: INTERNAL MEDICINE

## 2017-06-16 PROCEDURE — 0DBL8ZZ EXCISION OF TRANSVERSE COLON, VIA NATURAL OR ARTIFICIAL OPENING ENDOSCOPIC: ICD-10-PCS | Performed by: INTERNAL MEDICINE

## 2017-06-16 PROCEDURE — 0DBH8ZZ EXCISION OF CECUM, VIA NATURAL OR ARTIFICIAL OPENING ENDOSCOPIC: ICD-10-PCS | Performed by: INTERNAL MEDICINE

## 2017-06-16 PROCEDURE — 82962 GLUCOSE BLOOD TEST: CPT

## 2017-06-16 PROCEDURE — 0DB68ZX EXCISION OF STOMACH, VIA NATURAL OR ARTIFICIAL OPENING ENDOSCOPIC, DIAGNOSTIC: ICD-10-PCS | Performed by: INTERNAL MEDICINE

## 2017-06-16 PROCEDURE — 88305 TISSUE EXAM BY PATHOLOGIST: CPT | Performed by: INTERNAL MEDICINE

## 2017-06-16 RX ORDER — NALOXONE HYDROCHLORIDE 0.4 MG/ML
80 INJECTION, SOLUTION INTRAMUSCULAR; INTRAVENOUS; SUBCUTANEOUS AS NEEDED
Status: DISCONTINUED | OUTPATIENT
Start: 2017-06-16 | End: 2017-06-16 | Stop reason: HOSPADM

## 2017-06-16 RX ORDER — POTASSIUM CHLORIDE 20 MEQ/1
40 TABLET, EXTENDED RELEASE ORAL ONCE
Status: COMPLETED | OUTPATIENT
Start: 2017-06-16 | End: 2017-06-16

## 2017-06-16 RX ORDER — ONDANSETRON 2 MG/ML
4 INJECTION INTRAMUSCULAR; INTRAVENOUS AS NEEDED
Status: DISCONTINUED | OUTPATIENT
Start: 2017-06-16 | End: 2017-06-16 | Stop reason: HOSPADM

## 2017-06-16 RX ORDER — SODIUM CHLORIDE, SODIUM LACTATE, POTASSIUM CHLORIDE, CALCIUM CHLORIDE 600; 310; 30; 20 MG/100ML; MG/100ML; MG/100ML; MG/100ML
INJECTION, SOLUTION INTRAVENOUS CONTINUOUS
Status: DISCONTINUED | OUTPATIENT
Start: 2017-06-16 | End: 2017-06-17

## 2017-06-16 RX ORDER — DEXTROSE MONOHYDRATE 25 G/50ML
50 INJECTION, SOLUTION INTRAVENOUS
Status: DISCONTINUED | OUTPATIENT
Start: 2017-06-16 | End: 2017-06-16 | Stop reason: HOSPADM

## 2017-06-16 NOTE — PHYSICAL THERAPY NOTE
PHYSICAL THERAPY TREATMENT NOTE - INPATIENT    Room Number: 4922/9268-R     Session: 1  Number of Visits to Meet Established Goals: 5    Presenting Problem: anemia    Problem List  Principal Problem:    Anemia, unspecified type  Active Problems:    SCARLET (o have...  -   Turning over in bed (including adjusting bedclothes, sheets and blankets)?: None   -   Sitting down on and standing up from a chair with arms (e.g., wheelchair, bedside commode, etc.): A Little   -   Moving from lying on back to sitting on the (Obs): 5x/week    CURRENT GOALS     Goal #1  Patient is able to demonstrate supine - sit EOB @ level: modified independent      Goal #2  Patient is able to demonstrate transfers Sit to/from Stand at assistance level: modified independent      Goal #3  [facility]

## 2017-06-16 NOTE — PLAN OF CARE
DISCHARGE PLANNING    • Discharge to home or other facility with appropriate resources Progressing        Impaired Functional Mobility    • Achieve highest/safest level of mobility/gait Progressing        METABOLIC/FLUID AND ELECTROLYTES - ADULT    • Gluco

## 2017-06-16 NOTE — PAYOR COMM NOTE
--------------  CONTINUED STAY REVIEW    Payor: [employer]    6/15  CONT STAY    STILL FEELS ESCOBAR    Temp:  [98 °F (36.7 °C)-98.9 °F (37.2 °C)] 98.6 °F (37 °C)  Pulse:  [82-98] 95  Resp:  [13-28] 18  BP: (116-144)/(56-73) 135/72 mmHg      Exam  Gen: No acute

## 2017-06-16 NOTE — RESPIRATORY THERAPY NOTE
SCARLET : EQUIPMENT USE: DAILY SUMMARY                                            SET MODE: AUTO CPAP WITH CFLEX                                          USAGE IN HOURS: 3:10                                          90

## 2017-06-16 NOTE — CONSULTS
[facility]  Report of Consultation    Beni Mckay Patient Status:  Inpatient    3/25/1960 MRN [id number]   [facility] 3NE-A Attending Good Ko MD   Hosp Day # 2 PCP Jocelin Ma MD     Reason for Consultation:  Anemia    H insulin detemir (LEVEMIR) 100 UNIT/ML flextouch 17 Units, 17 Units, Subcutaneous, Daily  •  atorvastatin (LIPITOR) tab 20 mg, 20 mg, Oral, Daily  •  HYDROcodone-acetaminophen (NORCO)  MG per tab 1-2 tablet, 1-2 tablet, Oral, Q4H PRN  •  glucose (DEX4 B12 294 06/15/2017   PGLU 169 06/15/2017         Assessment/Plan:  Patient Active Problem List:     Osteoarthritis of left knee  Global 8/20/2017     Osteoarthritis of right knee     SCARLET (obstructive sleep apnea)     Rotator cuff tendinitis     Morbid ob

## 2017-06-16 NOTE — OPERATIVE REPORT
Rajanigala Luoutgala Patient Status:  Inpatient    3/25/1960 MRN [id number]   [facility] ENDOSCOPY Attending Lina Blanchard MD   Hosp Day # 3 PCP Becky Romero MD     PREOPERATIVE DIAGNOSIS/INDICATION: Anemia, blood loss  POSTOPERTATIVE DIAG precautions, watch for bleeding, infection, perforation, adverse drug reactions   - Follow biopsies.  - Repeat colonoscopy in 3 years.     Bev Roberts  6/16/2017  6:21 PM

## 2017-06-16 NOTE — PROGRESS NOTES
[facility] Hospitalist Progress Note                                                                   [facility]    Beni Mckay  3/25/1960    CC: f/u anemia    SUBJECTIVE:    Had some lightheadedness with Daily   • pantoprazole (PROTONIX) IV push  40 mg Intravenous QAM AC    Or   • Pantoprazole Sodium  40 mg Oral QAM AC   • atorvastatin  20 mg Oral Daily   • insulin aspart  2-10 Units Subcutaneous TID CC and HS   • penicillin G IVPB (>2 Million Units)  4 Mi

## 2017-06-16 NOTE — OPERATIVE REPORT
Beni HOWARD Woody Patient Status:  Inpatient    3/25/1960 MRN [id number]   [facility] ENDOSCOPY Attending Taryn Angulo MD   Hosp Day # 3 PCP Carrol Cho MD     PREOPERATIVE DIAGNOSIS/INDICATION: Anemia, blood loss  POSTOPERTATIVE DIAG

## 2017-06-16 NOTE — PROGRESS NOTES
Received patient at 0730  Patient A/O x 4  NSR on tele  Patient having clear, liquid stools this morning  Patient NPO since midnight  Awaiting EGD/Colonoscopy today  IV PCN  SCARLET/CPAP at night  Will continue to monitor.

## 2017-06-16 NOTE — ANESTHESIA POSTPROCEDURE EVALUATION
[facility]    Beni Mckay Patient Status:  Inpatient   Age/Gender 61year old male MRN [id number]   Location [address] [facility]. Attending Mendy Zheng MD   [facility] Day # 3 PCP Veda Ivy MD       Anesthesia Post-op Note    Procedure(s):

## 2017-06-17 VITALS
DIASTOLIC BLOOD PRESSURE: 76 MMHG | OXYGEN SATURATION: 95 % | WEIGHT: 315 LBS | TEMPERATURE: 98 F | RESPIRATION RATE: 18 BRPM | HEIGHT: 78 IN | BODY MASS INDEX: 36.45 KG/M2 | HEART RATE: 87 BPM | SYSTOLIC BLOOD PRESSURE: 141 MMHG

## 2017-06-17 PROCEDURE — C9113 INJ PANTOPRAZOLE SODIUM, VIA: HCPCS | Performed by: INTERNAL MEDICINE

## 2017-06-17 PROCEDURE — 84132 ASSAY OF SERUM POTASSIUM: CPT | Performed by: HOSPITALIST

## 2017-06-17 PROCEDURE — 85018 HEMOGLOBIN: CPT | Performed by: INTERNAL MEDICINE

## 2017-06-17 PROCEDURE — 82962 GLUCOSE BLOOD TEST: CPT

## 2017-06-17 RX ORDER — PANTOPRAZOLE SODIUM 40 MG/1
40 TABLET, DELAYED RELEASE ORAL
Qty: 60 TABLET | Refills: 0 | Status: SHIPPED | OUTPATIENT
Start: 2017-06-17 | End: 2020-03-20 | Stop reason: DRUGHIGH

## 2017-06-17 NOTE — PLAN OF CARE
DISCHARGE PLANNING    • Discharge to home or other facility with appropriate resources Adequate for Discharge        Impaired Functional Mobility    • Achieve highest/safest level of mobility/gait Adequate for Discharge        METABOLIC/FLUID AND ELECTROLY

## 2017-06-17 NOTE — PLAN OF CARE
NURSING DISCHARGE NOTE    Discharged Home via Wheelchair. Accompanied by Spouse and Support staff  Belongings Taken by patient/family. IV removed, discharge paperwork given to patient, all pertinent questions and concerns addressed.

## 2017-06-17 NOTE — DISCHARGE SUMMARY
General Medicine Discharge Summary     Patient ID:  Beni Mckay  61year old  3/25/1960    Admit date: 6/13/2017    Discharge date and time: 6/17/17    Attending Physician: DO REVA Thomas pending  -trend hemoglobin q 8 hour  -Transfuse for hgb <7 - stable currently  -cont PPI x 8 weeks    #Recent Septic Arthritis L knee-cont penicillin IV, continue to hold xarelto  #HL- cont statin  #DM2-When resumes diet, will resume home dose glargine - r mouth daily. !! Insulin Pen Needle (BD PEN NEEDLE SHORT U/F) 31G X 8 MM Does not apply Misc  Test twice daily    B Complex Vitamins (VITAMIN B COMPLEX OR)  1 tablet daily. MULTIPLE VITAMINS OR  Take 1 tablet by mouth daily.     docusate sodium 100 MG

## 2017-06-17 NOTE — PROGRESS NOTES
[facility]  Progress Note    Rajanigala LAWRENCE Woody Patient Status:  Inpatient    3/25/1960 MRN [id number]   [facility] 3NE-A Attending Juan Carlos Moss, [address] [facility] Day # 4 PCP Emily Elliott MD     Subjective:  Rajanigala HOWARD Mckya is a(n 61 patient today: 25 minutes.   Brittney Bourne  6/17/2017  4:53 PM

## 2017-06-17 NOTE — PROGRESS NOTES
[facility] Hospitalist Progress Note                                                                   [facility]    Beni Mckay  3/25/1960    CC: f/u anemia    SUBJECTIVE:  Pt denies any complaints.  Has n • sodium chloride   Intravenous Once     Continuous Infusions:   • lactated ringers 100 mL/hr at 06/17/17 1000     PRN: HYDROcodone-acetaminophen, glucose **OR** Glucose-Vitamin C **OR** dextrose 50% **OR** glucose **OR** Glucose-Vitamin C, acetaminophen

## 2017-06-17 NOTE — RESPIRATORY THERAPY NOTE
SCARLET - Equipment Use Daily Summary:                  . Set Mode:AUTO CPAP WITH C-FLEX                . Usage in hours: 8:37                . 90% Pressure (EPAP) level: 11.3                . 90% Insp. Pressure (IPAP): Travis Shen  AHI: 3.3

## 2017-06-18 ENCOUNTER — HOSPITAL ENCOUNTER (OUTPATIENT)
Facility: HOSPITAL | Age: 57
Setting detail: OBSERVATION
Discharge: HOME HEALTH | End: 2017-06-20
Attending: EMERGENCY MEDICINE | Admitting: INTERNAL MEDICINE
Payer: COMMERCIAL

## 2017-06-18 DIAGNOSIS — T80.219A PICC LINE INFECTION, INITIAL ENCOUNTER: Primary | ICD-10-CM

## 2017-06-18 DIAGNOSIS — D64.9 ANEMIA: ICD-10-CM

## 2017-06-18 PROCEDURE — 84145 PROCALCITONIN (PCT): CPT | Performed by: EMERGENCY MEDICINE

## 2017-06-18 PROCEDURE — 87070 CULTURE OTHR SPECIMN AEROBIC: CPT | Performed by: EMERGENCY MEDICINE

## 2017-06-18 PROCEDURE — 83605 ASSAY OF LACTIC ACID: CPT | Performed by: EMERGENCY MEDICINE

## 2017-06-18 PROCEDURE — 80053 COMPREHEN METABOLIC PANEL: CPT | Performed by: EMERGENCY MEDICINE

## 2017-06-18 PROCEDURE — 85025 COMPLETE CBC W/AUTO DIFF WBC: CPT | Performed by: EMERGENCY MEDICINE

## 2017-06-18 PROCEDURE — 96361 HYDRATE IV INFUSION ADD-ON: CPT

## 2017-06-18 PROCEDURE — 96365 THER/PROPH/DIAG IV INF INIT: CPT

## 2017-06-18 PROCEDURE — 94660 CPAP INITIATION&MGMT: CPT

## 2017-06-18 PROCEDURE — 83036 HEMOGLOBIN GLYCOSYLATED A1C: CPT | Performed by: HOSPITALIST

## 2017-06-18 PROCEDURE — 82962 GLUCOSE BLOOD TEST: CPT

## 2017-06-18 PROCEDURE — 87040 BLOOD CULTURE FOR BACTERIA: CPT | Performed by: EMERGENCY MEDICINE

## 2017-06-18 PROCEDURE — 99285 EMERGENCY DEPT VISIT HI MDM: CPT

## 2017-06-18 RX ORDER — HEPARIN SODIUM 5000 [USP'U]/ML
5000 INJECTION, SOLUTION INTRAVENOUS; SUBCUTANEOUS EVERY 8 HOURS SCHEDULED
Status: DISCONTINUED | OUTPATIENT
Start: 2017-06-18 | End: 2017-06-18

## 2017-06-18 RX ORDER — FLUTICASONE PROPIONATE 50 MCG
2 SPRAY, SUSPENSION (ML) NASAL DAILY
Status: DISCONTINUED | OUTPATIENT
Start: 2017-06-19 | End: 2017-06-20

## 2017-06-18 RX ORDER — HYDROCODONE BITARTRATE AND ACETAMINOPHEN 10; 325 MG/1; MG/1
1-2 TABLET ORAL EVERY 4 HOURS PRN
Status: DISCONTINUED | OUTPATIENT
Start: 2017-06-18 | End: 2017-06-20

## 2017-06-18 RX ORDER — ONDANSETRON 2 MG/ML
4 INJECTION INTRAMUSCULAR; INTRAVENOUS EVERY 4 HOURS PRN
Status: DISCONTINUED | OUTPATIENT
Start: 2017-06-18 | End: 2017-06-20

## 2017-06-18 RX ORDER — ATORVASTATIN CALCIUM 20 MG/1
20 TABLET, FILM COATED ORAL NIGHTLY
Status: DISCONTINUED | OUTPATIENT
Start: 2017-06-18 | End: 2017-06-20

## 2017-06-18 RX ORDER — DOCUSATE SODIUM 100 MG/1
100 CAPSULE, LIQUID FILLED ORAL 2 TIMES DAILY
Status: DISCONTINUED | OUTPATIENT
Start: 2017-06-18 | End: 2017-06-20

## 2017-06-18 RX ORDER — SODIUM CHLORIDE 9 MG/ML
INJECTION, SOLUTION INTRAVENOUS CONTINUOUS
Status: ACTIVE | OUTPATIENT
Start: 2017-06-18 | End: 2017-06-19

## 2017-06-18 RX ORDER — PANTOPRAZOLE SODIUM 40 MG/1
40 TABLET, DELAYED RELEASE ORAL
Status: DISCONTINUED | OUTPATIENT
Start: 2017-06-19 | End: 2017-06-20

## 2017-06-18 RX ORDER — DEXTROSE MONOHYDRATE 25 G/50ML
50 INJECTION, SOLUTION INTRAVENOUS
Status: DISCONTINUED | OUTPATIENT
Start: 2017-06-18 | End: 2017-06-20

## 2017-06-19 ENCOUNTER — APPOINTMENT (OUTPATIENT)
Dept: ULTRASOUND IMAGING | Facility: HOSPITAL | Age: 57
End: 2017-06-19
Attending: INTERNAL MEDICINE
Payer: COMMERCIAL

## 2017-06-19 PROCEDURE — 93971 EXTREMITY STUDY: CPT | Performed by: INTERNAL MEDICINE

## 2017-06-19 PROCEDURE — 85025 COMPLETE CBC W/AUTO DIFF WBC: CPT | Performed by: HOSPITALIST

## 2017-06-19 PROCEDURE — 83605 ASSAY OF LACTIC ACID: CPT | Performed by: EMERGENCY MEDICINE

## 2017-06-19 PROCEDURE — 83550 IRON BINDING TEST: CPT | Performed by: HOSPITALIST

## 2017-06-19 PROCEDURE — 82962 GLUCOSE BLOOD TEST: CPT

## 2017-06-19 PROCEDURE — 83540 ASSAY OF IRON: CPT | Performed by: HOSPITALIST

## 2017-06-19 PROCEDURE — 82728 ASSAY OF FERRITIN: CPT | Performed by: HOSPITALIST

## 2017-06-19 PROCEDURE — 80048 BASIC METABOLIC PNL TOTAL CA: CPT | Performed by: HOSPITALIST

## 2017-06-19 RX ORDER — LEVOFLOXACIN 750 MG/1
750 TABLET ORAL DAILY
Status: DISCONTINUED | OUTPATIENT
Start: 2017-06-19 | End: 2017-06-20

## 2017-06-19 NOTE — PLAN OF CARE
Diabetes/Glucose Control    • Glucose maintained within prescribed range Progressing        Patient/Family Goals    • Patient/Family Short Term Goal Progressing        SAFETY ADULT - FALL    • Free from fall injury Progressing        Received from ER per c

## 2017-06-19 NOTE — PLAN OF CARE
DISCHARGE PLANNING    • Discharge to home or other facility with appropriate resources Progressing        Diabetes/Glucose Control    • Glucose maintained within prescribed range Progressing        Patient/Family Goals    • Patient/Family Long Term Goal Pr

## 2017-06-19 NOTE — PAYOR COMM NOTE
--------------  ADMISSION REVIEW     Payor: [facility]  Subscriber #:  [id number]  Authorization Number: N/A    Admit date: 6/18/2017  6:48 PM       Admitting Physician: Alejandra Leyva MD  Attending Physician:  Michelle Martínez MD  Primary Care [facility]ici SURGERY Left 2015    COLONOSCOPY  2015    Comment Wei     Medications :   Pantoprazole Sodium 40 MG Oral Tab EC,  Take 1 tablet (40 mg total) by mouth every morning before breakfast.   glimepiride 4 MG Oral Tab,  Take 1 tablet (4 mg total) by mouth 2 (t stated complaint: PICC line infection  Other systems are as noted in HPI. Constitutional and vital signs reviewed. All other systems reviewed and negative except as noted above.     PSFH elements reviewed from today and agreed except as otherwise stat oriented to time place and person. Motor strength is 5 over 5 in all 4 extremities. There are no gross motor or sensory deficits appreciated. Cranial nerves II through XII are intact. Patient is answering all questions appropriately.     ED Course     Labs H&P Note     No notes of this type exist for this encounter.           MEDICATIONS ADMINISTERED IN LAST 1 DAY:  atorvastatin (LIPITOR) tab 20 mg     Date Action Dose Route User    6/18/2017 3665 Given 20 mg Oral Tam Hsieh RN      CeFAZolin Nanda

## 2017-06-19 NOTE — PROGRESS NOTES
[facility] Pharmacy Note: Antimicrobial Weight Dose Adjustment for: Ancef (cefazolin)    Beni Mckay is a 61year old male who has been prescribed Ancef (cefazolin) 1 gram every 8 hours.   CrCl is estimated creatinine clearance is 80.1 mL/min (based on Cr of

## 2017-06-19 NOTE — ED PROVIDER NOTES
Patient Seen in: [facility] Emergency Department    History   Patient presents with:  Cath Tube Problem (gastrointestinal, urinary, integumentary)    Stated Complaint: PICC line infection    HPI    Patient is a 48-year-old male who presents emergenc MetFORMIN HCl 1000 MG Oral Tab,  TAKE 1 BY MOUTH TWICE DAILY   HYDROcodone-acetaminophen  MG Oral Tab,  Take 1-2 tablets by mouth every 4 (four) hours as needed.    sodium chloride 0.9 % SOLN 100 mL with penicillin G potassium [id number] units SOLR 4 Mi 1-2 /week      Review of Systems    Positive for stated complaint: PICC line infection  Other systems are as noted in HPI. Constitutional and vital signs reviewed. All other systems reviewed and negative except as noted above.     [facility] elements review extremity appreciated. NEURO: Patient is awake, alert and oriented to time place and person. Motor strength is 5 over 5 in all 4 extremities. There are no gross motor or sensory deficits appreciated. Cranial nerves II through XII are intact.   Patient is a and the PICC line was removed here in the ER and the tip was sent for culture. Patient given a dose of IV Ancef in the emergency room. Dr. Pérez Miller was notified from the emergency room the patient will be admitted for further care at this time.   Patient wa

## 2017-06-19 NOTE — RESPIRATORY THERAPY NOTE
SCARLET : EQUIPMENT USE: DAILY SUMMARY                                            SET MODE: AUTO CPAP WITH CFLEX                                          USAGE IN HOURS: 6:25                                          90

## 2017-06-19 NOTE — PROGRESS NOTES
[facility] Pharmacy Note: Antimicrobial Weight Dose Adjustment for: Ancef (cefazolin)    Beni Mckay is a 61year old male who has been prescribed Ancef (cefazolin) 1gram.  CrCl is estimated creatinine clearance is 80.1 mL/min (based on Cr of 1.3).  and pt h

## 2017-06-19 NOTE — CONSULTS
INFECTIOUS DISEASE CONSULTATION    Beni Mckay Patient Status:  Observation    3/25/1960 MRN [id number]   [facility] 3SW-A Attending Gilberto Najjar, MD   Hosp Day # 1 PCP Jany Olivo, reports that he has never smoked. He has never used smokeless tobacco. He reports that he drinks about 1.2 oz of alcohol per week. He reports that he does not use illicit drugs.     Allergies:  No Known Allergies    Medications:    Current facility-adm lymphadenopathy. supple, no masses  Respiratory: Clear to auscultation bilaterally. No wheezes. No rhonchi. Cardiovascular: S1, S2.  Regular rate and rhythm. No murmurs. Abdomen: Soft, nontender, nondistended. Positive bowel sounds.   Left arm old picc encounter      ASSESSMENT/PLAN:  1.  Left knee prosthetic joint infection/late onset acute with bacteremia  SP I/D 5/24/17  -attempt at salvage, 6 weeks iv pcn, followed by po  CRP on 6/12 was still elevated at 3.37mg/dl - knee itself appears improved  PICC

## 2017-06-19 NOTE — H&P
.  CC: Patient presents with:  Cath Tube Problem (gastrointestinal, urinary, integumentary)       PCP: Daniela Montgomery MD    History of Present Illness: Patient is a 61year old male with PMH sig for recent admissions for GI bleed/ulcer s/p EGD and on PPI (ho tablet Rfl: 1   Empagliflozin (JARDIANCE) 25 MG Oral Tab Take 25 mg by mouth daily.  Disp: 90 tablet Rfl: 1   MetFORMIN HCl 1000 MG Oral Tab TAKE 1 BY MOUTH TWICE DAILY Disp: 180 tablet Rfl: 1   HYDROcodone-acetaminophen  MG Oral Tab Take 1-2 tablets • Obesity Sister    • Diabetes Brother    • Obesity Brother    • Diabetes Maternal Aunt        Review of Systems  Comprehensive ROS reviewed and negative except for what's stated above.        OBJECTIVE:  /68 mmHg  Pulse 90  Temp(Src) 98.6 °F (37 °C blood loss but since pt has since not showed signs of ongoing GI bleed, his counts should improve steadily over time.   - will need to trend and watch for further signs of hemodynamic instability for the anemia such as dizziness/orthostasis, tachycardia, so

## 2017-06-20 VITALS
SYSTOLIC BLOOD PRESSURE: 129 MMHG | TEMPERATURE: 99 F | HEIGHT: 78 IN | BODY MASS INDEX: 36.45 KG/M2 | OXYGEN SATURATION: 97 % | WEIGHT: 315 LBS | DIASTOLIC BLOOD PRESSURE: 74 MMHG | RESPIRATION RATE: 16 BRPM | HEART RATE: 89 BPM

## 2017-06-20 PROCEDURE — 86850 RBC ANTIBODY SCREEN: CPT | Performed by: HOSPITALIST

## 2017-06-20 PROCEDURE — 82962 GLUCOSE BLOOD TEST: CPT

## 2017-06-20 PROCEDURE — 76937 US GUIDE VASCULAR ACCESS: CPT

## 2017-06-20 PROCEDURE — 85018 HEMOGLOBIN: CPT | Performed by: HOSPITALIST

## 2017-06-20 PROCEDURE — 86901 BLOOD TYPING SEROLOGIC RH(D): CPT | Performed by: HOSPITALIST

## 2017-06-20 PROCEDURE — 86920 COMPATIBILITY TEST SPIN: CPT

## 2017-06-20 PROCEDURE — 30233N1 TRANSFUSION OF NONAUTOLOGOUS RED BLOOD CELLS INTO PERIPHERAL VEIN, PERCUTANEOUS APPROACH: ICD-10-PCS | Performed by: HOSPITALIST

## 2017-06-20 PROCEDURE — 36430 TRANSFUSION BLD/BLD COMPNT: CPT

## 2017-06-20 PROCEDURE — 36569 INSJ PICC 5 YR+ W/O IMAGING: CPT

## 2017-06-20 PROCEDURE — 86900 BLOOD TYPING SEROLOGIC ABO: CPT | Performed by: HOSPITALIST

## 2017-06-20 PROCEDURE — 97165 OT EVAL LOW COMPLEX 30 MIN: CPT

## 2017-06-20 PROCEDURE — 97162 PT EVAL MOD COMPLEX 30 MIN: CPT

## 2017-06-20 PROCEDURE — 05H533Z INSERTION OF INFUSION DEVICE INTO RIGHT SUBCLAVIAN VEIN, PERCUTANEOUS APPROACH: ICD-10-PCS | Performed by: INTERNAL MEDICINE

## 2017-06-20 RX ORDER — SODIUM CHLORIDE 0.9 % (FLUSH) 0.9 %
10 SYRINGE (ML) INJECTION AS NEEDED
Status: DISCONTINUED | OUTPATIENT
Start: 2017-06-20 | End: 2017-06-20

## 2017-06-20 RX ORDER — SODIUM CHLORIDE 9 MG/ML
INJECTION, SOLUTION INTRAVENOUS ONCE
Status: COMPLETED | OUTPATIENT
Start: 2017-06-20 | End: 2017-06-20

## 2017-06-20 RX ORDER — FLUCONAZOLE 200 MG/1
200 TABLET ORAL DAILY
Qty: 7 TABLET | Refills: 0 | Status: SHIPPED | OUTPATIENT
Start: 2017-06-20 | End: 2017-06-27

## 2017-06-20 RX ORDER — FLUCONAZOLE 100 MG/1
200 TABLET ORAL DAILY
Status: DISCONTINUED | OUTPATIENT
Start: 2017-06-20 | End: 2017-06-20

## 2017-06-20 RX ORDER — FERROUS SULFATE 325(65) MG
325 TABLET ORAL
Qty: 30 TABLET | Refills: 0 | Status: SHIPPED | COMMUNITY
Start: 2017-06-20 | End: 2020-03-20 | Stop reason: DRUGHIGH

## 2017-06-20 NOTE — PHYSICAL THERAPY NOTE
PHYSICAL THERAPY QUICK EVALUATION - INPATIENT    Room Number: 368/368-A  Evaluation Date: 6/20/2017  Presenting Problem: PICC line infection, recent L TKA revision  Physician Order: PT Eval and Treat   History related to current admission: Pt was admitted doing\"    OBJECTIVE  Precautions: Limb alert - right (PICC line R arm, L knee surgical incision from 5/27/17 I&D)  Fall Risk: Standard fall risk    WEIGHT BEARING RESTRICTION  Weight Bearing Restriction: None                PAIN ASSESSMENT  Rating: 3  Loc session/findings; All patient questions and concerns addressed;SCDs in place    ASSESSMENT        Patient is a 61year old male admitted on 6/18/2017 for PICC line infection.    Pertinent comorbidities and personal factors impacting therapy include recent ho

## 2017-06-20 NOTE — PLAN OF CARE
DISCHARGE PLANNING    • Discharge to home or other facility with appropriate resources Adequate for Discharge        Diabetes/Glucose Control    • Glucose maintained within prescribed range Adequate for Discharge        Impaired Functional Mobility    • Ac

## 2017-06-20 NOTE — OCCUPATIONAL THERAPY NOTE
OCCUPATIONAL THERAPY QUICK EVALUATION - INPATIENT    Room Number: 368/368-A  Evaluation Date: 6/20/2017     Type of Evaluation: Quick Eval  Presenting Problem: PICC line site infection, recent L knee I&D and poly exchange on 5/24/17    Physician Order: Wendy Mejia on the bed. Used crutches for stair management. Pt enjoys spending time with his son's new kitten, \"Black. \"    SUBJECTIVE  \"I figured what I needed to do, so I can get things done on my own. \" Pt commented when discussing ADL at home.     OBJECTIVE  Pre sink.  Modified independent sit to supine. Patient End of Session: In bed; With [facility] staff;Needs met;Call light within reach; All patient questions and concerns addressed; Family present    ASSESSMENT   Pt is performing ADL at modified independent level.  Jolie Pablo

## 2017-06-20 NOTE — RESPIRATORY THERAPY NOTE
SCARLET - Equipment Use Daily Summary:                  . Set Mode: AUTO CPAP WITH C-FLEX                . Usage in hours: 6.7                . 90% Pressure (EPAP) level: 13.5                . 90% Insp. Pressure (IPAP): [place] Master  AHI: 6.9

## 2017-06-20 NOTE — PROGRESS NOTES
[facility]                INFECTIOUS DISEASE PROGRESS NOTE    Beni Mckay Patient Status:  Observation    3/25/1960 MRN [id number]   [facility] 3SW-A Attending Jovanna Campos MD   Hosp Day # 2 PCP MD Lucrecia Kidd glenohumeral joint, right     Left leg cellulitis     Renal insufficiency     SCARLET on CPAP     Orthopedic aftercare     Anemia     Anemia, unspecified type     Dyspnea, unspecified type     S/P left knee surgery     Type 2 diabetes mellitus with complicatio

## 2017-06-20 NOTE — CM/SW NOTE
Pt is current with [facility] [phone] and [facility] [phone] for home IVAB. SW updated [facility]- will send new PICC line report/update  Also spoke with Mariluz Hope with [facility] re: possible d/c today.

## 2017-06-21 NOTE — PROGRESS NOTES
Blood transfusion done. Pt tolerated well, no reactions noted. Scripts for Iron and oral antibiotic given to wife. He stated he will take all his night meds at home. PICC line site dsg CDI. Discharge instructions provided, pt stated understanding.  Transpor

## 2017-06-21 NOTE — CM/SW NOTE
06/21/17 1100   Discharge disposition   Discharged to: Home-Health   Name of [facility] Proc. Suero Yahir 1 services after discharge Skilled home care; Other (comment)  (infusion)   Discharge transportation Private car   d/c 6/20- ANATOLY and

## 2017-06-24 NOTE — DISCHARGE SUMMARY
[facility] Discharge Summary    Patient ID:  Mihcael Mckay  [id number]  61year old  3/25/1960    Admit date: 6/18/2017  Discharge date and time: 6/20/17  Attending Physician: Morteza Morales MD  Primary Care Physician: Jany Olivo MD     Ad hospitalizations and notes that his BG are lower than usual.     Hospital Course:   Pt was seen by ID service and picc line was pulled. Tip culture grew out yeast and pt was placed on fluconazole. PICC line was replaced.  Pt was to continue on PCN IV as out HYDROcodone-acetaminophen  MG Tabs  Commonly known as:  NORCO  Take 1-2 tablets by mouth every 4 (four) hours as needed.      * Insulin Pen Needle 31G X 8 MM Misc  Commonly known as:  BD PEN NEEDLE SHORT U/F  Test twice daily     * NOVOTWIST 32G X 5 performed through the pulmonary arterial anatomy. 3D volume renderings are generated. Dose reduction techniques were used.  Dose information is transmitted to the ACR ([facility]) Augie. Sera Bojorquez [address] ([address]) which includes th oval complex fluid collection in the left upper arm over the region of the left PICC line insertion. Differential would include hematoma, seroma, abscess. CONCLUSION:  1. No DVT.  2. There is a 1.5 x 0.8 x 1.2 cm oval complex fluid collection in the le Prior  O2: none  Total Time Coordinating Care: Greater than 30 minutes Patient had opportunity to ask questions and state understand and agree with therapeutic plan as outlined

## 2017-06-26 ENCOUNTER — LAB REQUISITION (OUTPATIENT)
Dept: LAB | Facility: HOSPITAL | Age: 57
End: 2017-06-26
Attending: INTERNAL MEDICINE
Payer: COMMERCIAL

## 2017-06-26 DIAGNOSIS — T80.211D BLOODSTREAM INFECTION DUE TO CENTRAL VENOUS CATHETER, SUBSEQUENT ENCOUNTER: ICD-10-CM

## 2017-06-26 DIAGNOSIS — Z45.2 ENCOUNTER FOR ADJUSTMENT OR MANAGEMENT OF VASCULAR ACCESS DEVICE: ICD-10-CM

## 2017-06-26 PROCEDURE — 85025 COMPLETE CBC W/AUTO DIFF WBC: CPT | Performed by: INTERNAL MEDICINE

## 2017-06-26 PROCEDURE — 86140 C-REACTIVE PROTEIN: CPT | Performed by: INTERNAL MEDICINE

## 2017-06-26 PROCEDURE — 80053 COMPREHEN METABOLIC PANEL: CPT | Performed by: INTERNAL MEDICINE

## 2017-06-30 PROBLEM — T84.59XD INFECTION OF TOTAL KNEE REPLACEMENT, SUBSEQUENT ENCOUNTER: Status: ACTIVE | Noted: 2017-06-30

## 2017-06-30 PROBLEM — Z96.659 INFECTION OF TOTAL KNEE REPLACEMENT, SUBSEQUENT ENCOUNTER: Status: ACTIVE | Noted: 2017-06-30

## 2017-07-03 ENCOUNTER — LAB REQUISITION (OUTPATIENT)
Dept: LAB | Facility: HOSPITAL | Age: 57
End: 2017-07-03
Payer: COMMERCIAL

## 2017-07-03 DIAGNOSIS — Z45.2 ENCOUNTER FOR ADJUSTMENT OR MANAGEMENT OF VASCULAR ACCESS DEVICE: ICD-10-CM

## 2017-07-03 DIAGNOSIS — T80.211D BLOODSTREAM INFECTION DUE TO CENTRAL VENOUS CATHETER, SUBSEQUENT ENCOUNTER: ICD-10-CM

## 2017-07-03 LAB
ALBUMIN SERPL-MCNC: 2.9 G/DL (ref 3.5–4.8)
ALP LIVER SERPL-CCNC: 71 U/L (ref 45–117)
ALT SERPL-CCNC: 49 U/L (ref 17–63)
AST SERPL-CCNC: 21 U/L (ref 15–41)
BASOPHILS # BLD AUTO: 0.04 X10(3) UL (ref 0–0.1)
BASOPHILS NFR BLD AUTO: 0.8 %
BILIRUB SERPL-MCNC: 0.4 MG/DL (ref 0.1–2)
BUN BLD-MCNC: 13 MG/DL (ref 8–20)
C-REACTIVE PROTEIN: 7.03 MG/DL (ref ?–1)
CALCIUM BLD-MCNC: 8.9 MG/DL (ref 8.3–10.3)
CHLORIDE: 108 MMOL/L (ref 101–111)
CO2: 25 MMOL/L (ref 22–32)
CREAT BLD-MCNC: 1.11 MG/DL (ref 0.7–1.3)
EOSINOPHIL # BLD AUTO: 0.13 X10(3) UL (ref 0–0.3)
EOSINOPHIL NFR BLD AUTO: 2.7 %
ERYTHROCYTE [DISTWIDTH] IN BLOOD BY AUTOMATED COUNT: 16.3 % (ref 11.5–16)
GLUCOSE BLD-MCNC: 139 MG/DL (ref 70–99)
HCT VFR BLD AUTO: 33.2 % (ref 37–53)
HGB BLD-MCNC: 10.1 G/DL (ref 13–17)
IMMATURE GRANULOCYTE COUNT: 0.01 X10(3) UL (ref 0–1)
IMMATURE GRANULOCYTE RATIO %: 0.2 %
LYMPHOCYTES # BLD AUTO: 1.15 X10(3) UL (ref 0.9–4)
LYMPHOCYTES NFR BLD AUTO: 23.6 %
M PROTEIN MFR SERPL ELPH: 8.1 G/DL (ref 6.1–8.3)
MCH RBC QN AUTO: 24.1 PG (ref 27–33.2)
MCHC RBC AUTO-ENTMCNC: 30.4 G/DL (ref 31–37)
MCV RBC AUTO: 79.2 FL (ref 80–99)
MONOCYTES # BLD AUTO: 0.48 X10(3) UL (ref 0.1–0.6)
MONOCYTES NFR BLD AUTO: 9.9 %
NEUTROPHIL ABS PRELIM: 3.06 X10 (3) UL (ref 1.3–6.7)
NEUTROPHILS # BLD AUTO: 3.06 X10(3) UL (ref 1.3–6.7)
NEUTROPHILS NFR BLD AUTO: 62.8 %
PLATELET # BLD AUTO: 360 10(3)UL (ref 150–450)
POTASSIUM SERPL-SCNC: 4 MMOL/L (ref 3.6–5.1)
RBC # BLD AUTO: 4.19 X10(6)UL (ref 4.3–5.7)
RED CELL DISTRIBUTION WIDTH-SD: 46.3 FL (ref 35.1–46.3)
SODIUM SERPL-SCNC: 141 MMOL/L (ref 136–144)
WBC # BLD AUTO: 4.9 X10(3) UL (ref 4–13)

## 2017-07-03 PROCEDURE — 80053 COMPREHEN METABOLIC PANEL: CPT | Performed by: INTERNAL MEDICINE

## 2017-07-03 PROCEDURE — 86140 C-REACTIVE PROTEIN: CPT | Performed by: INTERNAL MEDICINE

## 2017-07-03 PROCEDURE — 85025 COMPLETE CBC W/AUTO DIFF WBC: CPT | Performed by: INTERNAL MEDICINE

## 2017-07-07 PROBLEM — M25.561 ACUTE PAIN OF RIGHT KNEE: Status: ACTIVE | Noted: 2017-07-07

## 2017-07-31 ENCOUNTER — LAB ENCOUNTER (OUTPATIENT)
Dept: LAB | Age: 57
End: 2017-07-31
Attending: INTERNAL MEDICINE
Payer: COMMERCIAL

## 2017-07-31 DIAGNOSIS — M19.90 OSTEOARTHRITIS: Primary | ICD-10-CM

## 2017-07-31 PROCEDURE — 86140 C-REACTIVE PROTEIN: CPT

## 2017-08-01 LAB — C-REACTIVE PROTEIN: 0.87 MG/DL (ref ?–1)

## 2017-11-07 ENCOUNTER — LAB ENCOUNTER (OUTPATIENT)
Dept: LAB | Age: 57
End: 2017-11-07
Attending: INTERNAL MEDICINE
Payer: COMMERCIAL

## 2017-11-07 DIAGNOSIS — T84.54XA INFECTION OF PROSTHETIC LEFT KNEE JOINT (HCC): Primary | ICD-10-CM

## 2017-11-07 PROCEDURE — 85025 COMPLETE CBC W/AUTO DIFF WBC: CPT

## 2017-11-07 PROCEDURE — 80053 COMPREHEN METABOLIC PANEL: CPT

## 2017-11-07 PROCEDURE — 86140 C-REACTIVE PROTEIN: CPT

## 2017-12-11 PROBLEM — M75.81 TENDINITIS OF RIGHT ROTATOR CUFF: Status: ACTIVE | Noted: 2017-12-11

## 2018-02-05 PROBLEM — E11.610 CHARCOT FOOT DUE TO DIABETES MELLITUS (HCC): Status: ACTIVE | Noted: 2018-02-05

## 2018-04-25 PROBLEM — E11.9 DIABETES MELLITUS TYPE 2 WITHOUT RETINOPATHY (HCC): Status: ACTIVE | Noted: 2018-04-25

## 2018-04-25 PROBLEM — H25.13 NUCLEAR SCLEROTIC CATARACT OF BOTH EYES: Status: ACTIVE | Noted: 2018-04-25

## 2018-05-09 ENCOUNTER — LAB ENCOUNTER (OUTPATIENT)
Dept: LAB | Age: 58
End: 2018-05-09
Attending: INTERNAL MEDICINE
Payer: COMMERCIAL

## 2018-05-09 DIAGNOSIS — T84.59XA INFECTION OF PROSTHETIC KNEE JOINT (HCC): Primary | ICD-10-CM

## 2018-05-09 DIAGNOSIS — Z96.659 INFECTION OF PROSTHETIC KNEE JOINT (HCC): Primary | ICD-10-CM

## 2018-05-09 PROCEDURE — 86140 C-REACTIVE PROTEIN: CPT

## 2018-06-08 PROCEDURE — 82043 UR ALBUMIN QUANTITATIVE: CPT | Performed by: INTERNAL MEDICINE

## 2018-06-08 PROCEDURE — 82570 ASSAY OF URINE CREATININE: CPT | Performed by: INTERNAL MEDICINE

## 2018-09-11 PROBLEM — E66.01 MORBID OBESITY WITH BMI OF 45.0-49.9, ADULT (HCC): Status: ACTIVE | Noted: 2018-09-11

## 2019-02-18 PROBLEM — Z47.89 ORTHOPEDIC AFTERCARE: Status: RESOLVED | Noted: 2017-05-26 | Resolved: 2019-02-18

## 2019-02-18 PROBLEM — M25.561 ACUTE PAIN OF RIGHT KNEE: Status: RESOLVED | Noted: 2017-07-07 | Resolved: 2019-02-18

## 2019-02-18 PROBLEM — Z98.890 S/P LEFT KNEE SURGERY: Status: RESOLVED | Noted: 2017-06-14 | Resolved: 2019-02-18

## 2019-02-18 PROBLEM — T80.219A PICC LINE INFECTION, INITIAL ENCOUNTER: Status: RESOLVED | Noted: 2017-06-18 | Resolved: 2019-02-18

## 2019-02-18 PROBLEM — H25.13 NUCLEAR SCLEROTIC CATARACT OF BOTH EYES: Status: RESOLVED | Noted: 2018-04-25 | Resolved: 2019-02-18

## 2019-02-18 PROBLEM — T80.219A PICC LINE INFECTION: Status: RESOLVED | Noted: 2017-06-18 | Resolved: 2019-02-18

## 2019-02-18 PROBLEM — D64.9 ANEMIA, UNSPECIFIED TYPE: Status: RESOLVED | Noted: 2017-06-14 | Resolved: 2019-02-18

## 2019-02-18 PROBLEM — T84.59XD INFECTION OF TOTAL KNEE REPLACEMENT, SUBSEQUENT ENCOUNTER: Status: RESOLVED | Noted: 2017-06-30 | Resolved: 2019-02-18

## 2019-02-18 PROBLEM — R06.00 DYSPNEA, UNSPECIFIED TYPE: Status: RESOLVED | Noted: 2017-06-14 | Resolved: 2019-02-18

## 2019-02-18 PROBLEM — R73.9 HYPERGLYCEMIA: Status: RESOLVED | Noted: 2017-06-14 | Resolved: 2019-02-18

## 2019-02-18 PROBLEM — M75.81 TENDINITIS OF RIGHT ROTATOR CUFF: Status: RESOLVED | Noted: 2017-12-11 | Resolved: 2019-02-18

## 2019-02-18 PROBLEM — Z96.659 INFECTION OF TOTAL KNEE REPLACEMENT, SUBSEQUENT ENCOUNTER: Status: RESOLVED | Noted: 2017-06-30 | Resolved: 2019-02-18

## 2019-02-18 PROBLEM — D64.9 ANEMIA: Status: RESOLVED | Noted: 2017-06-14 | Resolved: 2019-02-18

## 2019-02-18 PROBLEM — L03.116 LEFT LEG CELLULITIS: Status: RESOLVED | Noted: 2017-05-22 | Resolved: 2019-02-18

## 2019-02-18 PROBLEM — E66.01 MORBID OBESITY, UNSPECIFIED OBESITY TYPE (HCC): Status: RESOLVED | Noted: 2017-06-14 | Resolved: 2019-02-18

## 2019-02-22 PROCEDURE — 86803 HEPATITIS C AB TEST: CPT | Performed by: INTERNAL MEDICINE

## 2019-09-03 PROBLEM — Z23 FLU VACCINE NEED: Status: ACTIVE | Noted: 2019-09-03

## 2019-09-03 PROBLEM — E11.42 DIABETIC POLYNEUROPATHY ASSOCIATED WITH TYPE 2 DIABETES MELLITUS (HCC): Status: ACTIVE | Noted: 2019-09-03

## 2019-09-03 PROBLEM — E55.9 VITAMIN D DEFICIENCY: Status: ACTIVE | Noted: 2019-09-03

## 2019-09-03 PROBLEM — E11.65 UNCONTROLLED TYPE 2 DIABETES MELLITUS WITH HYPERGLYCEMIA, WITH LONG-TERM CURRENT USE OF INSULIN (HCC): Status: ACTIVE | Noted: 2019-09-03

## 2019-09-03 PROBLEM — I10 HYPERTENSION, ESSENTIAL: Status: ACTIVE | Noted: 2019-09-03

## 2019-09-03 PROBLEM — E78.5 HYPERLIPIDEMIA, UNSPECIFIED HYPERLIPIDEMIA TYPE: Status: ACTIVE | Noted: 2019-09-03

## 2019-09-03 PROBLEM — Z79.4 UNCONTROLLED TYPE 2 DIABETES MELLITUS WITH HYPERGLYCEMIA, WITH LONG-TERM CURRENT USE OF INSULIN (HCC): Status: ACTIVE | Noted: 2019-09-03

## 2019-09-03 PROBLEM — E11.65 CONTROLLED TYPE 2 DIABETES MELLITUS WITH HYPERGLYCEMIA, WITHOUT LONG-TERM CURRENT USE OF INSULIN (HCC): Status: ACTIVE | Noted: 2019-09-03

## 2020-06-25 PROBLEM — N28.9 RENAL INSUFFICIENCY: Status: RESOLVED | Noted: 2017-05-22 | Resolved: 2020-06-25

## 2020-06-25 PROBLEM — Z23 FLU VACCINE NEED: Status: RESOLVED | Noted: 2019-09-03 | Resolved: 2020-06-25

## 2020-11-07 ENCOUNTER — HOSPITAL ENCOUNTER (EMERGENCY)
Facility: HOSPITAL | Age: 60
Discharge: HOME | End: 2020-11-07
Attending: EMERGENCY MEDICINE
Payer: COMMERCIAL

## 2020-11-07 VITALS
HEART RATE: 72 BPM | TEMPERATURE: 97 F | BODY MASS INDEX: 28.34 KG/M2 | SYSTOLIC BLOOD PRESSURE: 111 MMHG | DIASTOLIC BLOOD PRESSURE: 51 MMHG | OXYGEN SATURATION: 95 % | RESPIRATION RATE: 21 BRPM | HEIGHT: 77.01 IN | WEIGHT: 240.06 LBS

## 2020-11-07 DIAGNOSIS — U07.1 COVID-19: Primary | ICD-10-CM

## 2020-11-07 DIAGNOSIS — J12.82 PNEUMONIA DUE TO COVID-19 VIRUS: ICD-10-CM

## 2020-11-07 DIAGNOSIS — U07.1 PNEUMONIA DUE TO COVID-19 VIRUS: ICD-10-CM

## 2020-11-07 PROCEDURE — 99453 REM MNTR PHYSIOL PARAM SETUP: CPT

## 2020-11-07 PROCEDURE — 99285 EMERGENCY DEPT VISIT HI MDM: CPT

## 2020-11-07 PROCEDURE — 93010 ELECTROCARDIOGRAM REPORT: CPT

## 2020-11-07 PROCEDURE — 85025 COMPLETE CBC W/AUTO DIFF WBC: CPT | Performed by: EMERGENCY MEDICINE

## 2020-11-07 PROCEDURE — 80053 COMPREHEN METABOLIC PANEL: CPT | Performed by: EMERGENCY MEDICINE

## 2020-11-07 PROCEDURE — 93005 ELECTROCARDIOGRAM TRACING: CPT

## 2020-11-07 PROCEDURE — 96360 HYDRATION IV INFUSION INIT: CPT

## 2020-11-07 NOTE — ED PROVIDER NOTES
Patient Seen in: [facility] Emergency Department      History   Patient presents with:  Covid  Difficulty Breathing    Stated Complaint: + COVID, increasing PAVITHRA    HPI    This is a 26-year-old male who arrives here with complaints of positive Covid. demonstrate well expanded lungs without focal consolidations or pneumothoraces. IMPRESSION: 1. No radiographic evidence of pulmonary embolism. 2. Severe multifocal groundglass airspace disease, raising concern for viral pneumonia.  COVID-19 pneumonia im Drinks per session: 1 or 2      Binge frequency: Never      Comment: 1-2 /week    Drug use: No             Review of Systems    Positive for stated complaint: + COVID, increasing PAVITHRA  Other systems are as noted in HPI.   Constitutional and vital signs revie Ratio 0.7 (*)     All other components within normal limits   CBC W/ DIFFERENTIAL - Abnormal; Notable for the following components:    Lymphocyte Absolute 0.92 (*)     All other components within normal limits   CBC WITH DIFFERENTIAL WITH PLATELET    [place] convalescent plasma. He was given a pulse oximeter. Incentive spirometry I discussed the things to return including trouble breathing, chest pain or any other concerns or inability to tolerate p.o. fluids.   He verbalized understanding and agreed with the

## 2020-11-09 ENCOUNTER — HOSPITAL ENCOUNTER (INPATIENT)
Facility: HOSPITAL | Age: 60
LOS: 2 days | Discharge: HOME | DRG: 177 | End: 2020-11-12
Attending: EMERGENCY MEDICINE | Admitting: INTERNAL MEDICINE
Payer: COMMERCIAL

## 2020-11-09 DIAGNOSIS — U07.1 COVID-19: Primary | ICD-10-CM

## 2020-11-09 PROBLEM — R73.9 HYPERGLYCEMIA: Status: ACTIVE | Noted: 2020-11-09

## 2020-11-09 PROCEDURE — 82550 ASSAY OF CK (CPK): CPT | Performed by: EMERGENCY MEDICINE

## 2020-11-09 PROCEDURE — 83036 HEMOGLOBIN GLYCOSYLATED A1C: CPT | Performed by: INTERNAL MEDICINE

## 2020-11-09 PROCEDURE — 84484 ASSAY OF TROPONIN QUANT: CPT | Performed by: EMERGENCY MEDICINE

## 2020-11-09 PROCEDURE — 99285 EMERGENCY DEPT VISIT HI MDM: CPT

## 2020-11-09 PROCEDURE — 80053 COMPREHEN METABOLIC PANEL: CPT | Performed by: EMERGENCY MEDICINE

## 2020-11-09 PROCEDURE — 83880 ASSAY OF NATRIURETIC PEPTIDE: CPT | Performed by: EMERGENCY MEDICINE

## 2020-11-09 PROCEDURE — 87040 BLOOD CULTURE FOR BACTERIA: CPT | Performed by: EMERGENCY MEDICINE

## 2020-11-09 PROCEDURE — 84145 PROCALCITONIN (PCT): CPT | Performed by: EMERGENCY MEDICINE

## 2020-11-09 PROCEDURE — 83615 LACTATE (LD) (LDH) ENZYME: CPT | Performed by: EMERGENCY MEDICINE

## 2020-11-09 PROCEDURE — 82728 ASSAY OF FERRITIN: CPT | Performed by: EMERGENCY MEDICINE

## 2020-11-09 PROCEDURE — 36415 COLL VENOUS BLD VENIPUNCTURE: CPT

## 2020-11-09 PROCEDURE — 86140 C-REACTIVE PROTEIN: CPT | Performed by: EMERGENCY MEDICINE

## 2020-11-09 PROCEDURE — 85379 FIBRIN DEGRADATION QUANT: CPT | Performed by: EMERGENCY MEDICINE

## 2020-11-09 PROCEDURE — 85025 COMPLETE CBC W/AUTO DIFF WBC: CPT | Performed by: EMERGENCY MEDICINE

## 2020-11-09 RX ORDER — ONDANSETRON 2 MG/ML
4 INJECTION INTRAMUSCULAR; INTRAVENOUS EVERY 4 HOURS PRN
Status: CANCELLED | OUTPATIENT
Start: 2020-11-09

## 2020-11-09 RX ORDER — HYDROMORPHONE HYDROCHLORIDE 1 MG/ML
0.5 INJECTION, SOLUTION INTRAMUSCULAR; INTRAVENOUS; SUBCUTANEOUS EVERY 30 MIN PRN
Status: CANCELLED | OUTPATIENT
Start: 2020-11-09 | End: 2020-11-09

## 2020-11-09 NOTE — ED PROVIDER NOTES
Patient Seen in: [facility] Emergency Department      History   Patient presents with:  Difficulty Breathing    Stated Complaint: hypoxia/covid pneumonia    HPI    18-year-old male who presents here to the emergency department with 12 days of fever, Comment: 1-2 /week    Drug use: No             Review of Systems    Positive for stated complaint: hypoxia/covid pneumonia  Other systems are as noted in HPI. Constitutional and vital signs reviewed.       All other systems reviewed and negative except as D-Dimer 1.04 (*)     All other components within normal limits   LDH - Abnormal; Notable for the following components:     (*)     All other components within normal limits   C-REACTIVE PROTEIN - Abnormal; Notable for the following components:    C- 12.8 hematocrit 37.9. LDH of 468. Spoke to the hospital service and infectious disease the patient was admitted for further care.   Admission disposition: 11/9/2020  6:30 PM                        Disposition and Plan     Clinical Impression:  COVID-19  (

## 2020-11-09 NOTE — ED INITIAL ASSESSMENT (HPI)
Pt to ED with continued SOB, pt denies symptoms are worse but states they are not going away. Diagnosed with covid on 10/30. Pt reports low oxygen saturation on home pulse ox. Current O2 sat 95% on room air upon check in.

## 2020-11-10 PROCEDURE — 85379 FIBRIN DEGRADATION QUANT: CPT | Performed by: INTERNAL MEDICINE

## 2020-11-10 PROCEDURE — 87205 SMEAR GRAM STAIN: CPT | Performed by: EMERGENCY MEDICINE

## 2020-11-10 PROCEDURE — 87070 CULTURE OTHR SPECIMN AEROBIC: CPT | Performed by: EMERGENCY MEDICINE

## 2020-11-10 PROCEDURE — 82728 ASSAY OF FERRITIN: CPT | Performed by: INTERNAL MEDICINE

## 2020-11-10 PROCEDURE — 86140 C-REACTIVE PROTEIN: CPT | Performed by: INTERNAL MEDICINE

## 2020-11-10 PROCEDURE — 93005 ELECTROCARDIOGRAM TRACING: CPT

## 2020-11-10 PROCEDURE — 83615 LACTATE (LD) (LDH) ENZYME: CPT | Performed by: INTERNAL MEDICINE

## 2020-11-10 PROCEDURE — 81001 URINALYSIS AUTO W/SCOPE: CPT | Performed by: EMERGENCY MEDICINE

## 2020-11-10 PROCEDURE — 85025 COMPLETE CBC W/AUTO DIFF WBC: CPT | Performed by: INTERNAL MEDICINE

## 2020-11-10 PROCEDURE — 82962 GLUCOSE BLOOD TEST: CPT

## 2020-11-10 PROCEDURE — 3E0333Z INTRODUCTION OF ANTI-INFLAMMATORY INTO PERIPHERAL VEIN, PERCUTANEOUS APPROACH: ICD-10-PCS | Performed by: HOSPITALIST

## 2020-11-10 PROCEDURE — 85007 BL SMEAR W/DIFF WBC COUNT: CPT | Performed by: INTERNAL MEDICINE

## 2020-11-10 PROCEDURE — 85027 COMPLETE CBC AUTOMATED: CPT | Performed by: INTERNAL MEDICINE

## 2020-11-10 PROCEDURE — 93010 ELECTROCARDIOGRAM REPORT: CPT | Performed by: INTERNAL MEDICINE

## 2020-11-10 PROCEDURE — 80048 BASIC METABOLIC PNL TOTAL CA: CPT | Performed by: INTERNAL MEDICINE

## 2020-11-10 RX ORDER — SODIUM PHOSPHATE, DIBASIC AND SODIUM PHOSPHATE, MONOBASIC 7; 19 G/133ML; G/133ML
1 ENEMA RECTAL ONCE AS NEEDED
Status: DISCONTINUED | OUTPATIENT
Start: 2020-11-10 | End: 2020-11-12

## 2020-11-10 RX ORDER — DEXTROSE MONOHYDRATE 25 G/50ML
50 INJECTION, SOLUTION INTRAVENOUS
Status: DISCONTINUED | OUTPATIENT
Start: 2020-11-10 | End: 2020-11-12

## 2020-11-10 RX ORDER — ONDANSETRON 2 MG/ML
4 INJECTION INTRAMUSCULAR; INTRAVENOUS EVERY 6 HOURS PRN
Status: DISCONTINUED | OUTPATIENT
Start: 2020-11-10 | End: 2020-11-12

## 2020-11-10 RX ORDER — BISACODYL 10 MG
10 SUPPOSITORY, RECTAL RECTAL
Status: DISCONTINUED | OUTPATIENT
Start: 2020-11-10 | End: 2020-11-12

## 2020-11-10 RX ORDER — DEXAMETHASONE SODIUM PHOSPHATE 4 MG/ML
6 VIAL (ML) INJECTION DAILY
Status: DISCONTINUED | OUTPATIENT
Start: 2020-11-10 | End: 2020-11-12

## 2020-11-10 RX ORDER — POLYETHYLENE GLYCOL 3350 17 G/17G
17 POWDER, FOR SOLUTION ORAL DAILY PRN
Status: DISCONTINUED | OUTPATIENT
Start: 2020-11-10 | End: 2020-11-12

## 2020-11-10 RX ORDER — ASPIRIN 81 MG/1
81 TABLET, CHEWABLE ORAL DAILY
Status: DISCONTINUED | OUTPATIENT
Start: 2020-11-10 | End: 2020-11-12

## 2020-11-10 RX ORDER — ACETAMINOPHEN 325 MG/1
650 TABLET ORAL EVERY 6 HOURS PRN
Status: DISCONTINUED | OUTPATIENT
Start: 2020-11-10 | End: 2020-11-12

## 2020-11-10 RX ORDER — ENOXAPARIN SODIUM 100 MG/ML
40 INJECTION SUBCUTANEOUS DAILY
Status: DISCONTINUED | OUTPATIENT
Start: 2020-11-10 | End: 2020-11-12

## 2020-11-10 RX ORDER — CALCIUM CARBONATE 200(500)MG
500 TABLET,CHEWABLE ORAL
Status: DISCONTINUED | OUTPATIENT
Start: 2020-11-10 | End: 2020-11-12

## 2020-11-10 NOTE — PLAN OF CARE
NURSING ADMISSION NOTE      Patient admitted via Cart  Oriented to room. Safety precautions initiated. Bed in low position. Call light in reach.     Problem: Patient/Family Goals  Goal: Patient/Family Long Term Goal  Description: Patient's Long Term assessment.  - Educate pt/family on patient safety including physical limitations  - Instruct pt to call for assistance with activity based on assessment  - Modify environment to reduce risk of injury  - Provide assistive devices as appropriate  - Consider signs/symptoms of CO2 retention  Outcome: Progressing   pt is admitted for PNA and Covid positive. Droplet/Contact isolation precaution. Pt is AOx4, VSS, afebrile and denies any pain. Spo2 maintained on 2L. . Encouraged to sleep in prone position.  Pro

## 2020-11-10 NOTE — H&P
[facility] Hospitalist History and Physical      Patient presents with:  Difficulty Breathing       PCP: Valentín Echeverria MD      History of Present Illness: Patient is a 63year old male with PMH sig for obesity, SCARLET, DM2, severe obesity w complications presents for flares. •  aspirin 81 MG Oral Tab, Take 81 mg by mouth daily. •  Sildenafil Citrate (VIAGRA) 100 MG Oral Tab, Take as directed    •  Cholecalciferol (VITAMIN D) 1000 UNITS Oral Tab, Take 1 tablet by mouth daily.     •  B Complex Vitamins (VITAMIN B CO Extremities: Extremities normal, atraumatic, no cyanosis or edema. Skin: Skin color, texture, turgor normal. No rashes or lesions.     Neurologic: Normal strength, no focal deficit appreciated     Data Review:    LABS:   Lab Results   Component Value Da such as a 12 x 10 mm in the right paratracheal node and a 13 x 11 mm subcarinal lymph node. Also noted are a few right hilar lymph nodes, largest of which measures 16 x 10 mm. No other mass lesions are identified.  No pericardial or pleural effusions are se

## 2020-11-10 NOTE — PROGRESS NOTES
[facility] Hospitalist Progress Note                                                                   [facility]    Beni HOWARD Woody  3/25/1960    SUBJECTIVE:  On 2L, satus 91-96. No fevers.       OBJECTIVE: Glucose-Vitamin C, Calcium Carbonate Antacid    Assessment/Plan:  Principal Problem:    COVID-19  Active Problems:    Hyperglycemia    # hypoxia  # covid pna  - CT chest 11/7 w multifocal ground glass pna consistent w covid  - admit for 02, follow inflam m

## 2020-11-10 NOTE — PROGRESS NOTES
[facility]                INFECTIOUS DISEASE PROGRESS NOTE    Beni Mckay Patient Status:  Inpatient    3/25/1960 MRN [id number]   [facility] 5NW-A Attending Cm Blair MD   Hosp Day # 0 PCP Michelle Mojica MD K 4.02 3.6 3.7 4.1    103 110 111   CO2 21.0* 24.0 24.0 23.0   ALKPHO 57 66 52  --    AST 52* 52* 48*  --    ALT 34 38 40  --    BILT 0.70 0.7 0.7  --    TP 7.6 7.7 7.3  --          Problem list reviewed:  Patient Active Problem List:     Type 2 di

## 2020-11-10 NOTE — CONSULTS
INFECTIOUS DISEASE CONSULTATION    Beni Mckay Patient Status:  Emergency    3/25/1960 MRN [id number]   Location [address] [facility] Attending Char Palmer MD   [address] [facility] Day # 0 P Aunt       reports that he has never smoked. He has never used smokeless tobacco. He reports current alcohol use of about 2.0 standard drinks of alcohol per week. He reports that he does not use drugs.     Allergies:  No Known Allergies    Medications:  No BUN 19.0 22* 15   CREATSERUM 1.44* 1.70* 1.02   GFRAA  --  49* 91   GFRNAA  --  43* 79   CA  --  8.7 9.0   ALB 3.9 3.2* 2.9*    136 140   K 4.02 3.6 3.7    103 110   CO2 21.0* 24.0 24.0   ALKPHO 57 66 52   AST 52* 52* 48*   ALT 34 38 40   EZEQUIEL 10/30  Fever now resolved but had dyspnea, and desatruation with exertion  Currently 95% at rest    Unclear benefit to Remdesivir with symptoms x 12 days, resolution of fever   Was given steroids for dyspnea  Continue to monitor        SVETLANA Crocker

## 2020-11-11 PROCEDURE — 86140 C-REACTIVE PROTEIN: CPT | Performed by: INTERNAL MEDICINE

## 2020-11-11 PROCEDURE — 85379 FIBRIN DEGRADATION QUANT: CPT | Performed by: INTERNAL MEDICINE

## 2020-11-11 PROCEDURE — 82962 GLUCOSE BLOOD TEST: CPT

## 2020-11-11 PROCEDURE — 82728 ASSAY OF FERRITIN: CPT | Performed by: INTERNAL MEDICINE

## 2020-11-11 PROCEDURE — 83615 LACTATE (LD) (LDH) ENZYME: CPT | Performed by: INTERNAL MEDICINE

## 2020-11-11 PROCEDURE — 85025 COMPLETE CBC W/AUTO DIFF WBC: CPT | Performed by: INTERNAL MEDICINE

## 2020-11-11 NOTE — PLAN OF CARE
Patient is alert and oriented x4. Full Code Admit with SOB and coughing: covid +. Cough is productive. Sputum sample sent to lab today. Diminished lung bases. Instructed on IS use and using in room throughout shift.   Continues on 1.5L O2 via NC sating 91- anticipated neutropenic period  Description: INTERVENTIONS  - Monitor WBC  - Administer growth factors as ordered  - Implement neutropenic guidelines  Outcome: Progressing     Problem: SAFETY ADULT - FALL  Goal: Free from fall injury  Description: Malena Sena minimize respiratory effort  - Oxygen supplementation based on oxygen saturation or ABGs  - Provide Smoking Cessation handout, if applicable  - Encourage broncho-pulmonary hygiene including cough, deep breathe, Incentive Spirometry  - Assess the need for s

## 2020-11-11 NOTE — PROGRESS NOTES
[facility]                INFECTIOUS DISEASE PROGRESS NOTE    Beni Mckay Patient Status:  Inpatient    3/25/1960 MRN [id number]   [facility] 5NW-A Attending Matt Sanchez MD   Hosp Day # 1 PCP Maycol Gastelum MD GFRNAA  --  43* 79 92   CA  --  8.7 9.0 8.6   ALB 3.9 3.2* 2.9*  --     136 140 140   K 4.02 3.6 3.7 4.1    103 110 111   CO2 21.0* 24.0 24.0 23.0   ALKPHO 57 66 52  --    AST 52* 52* 48*  --    ALT 34 38 40  --    BILT 0.70 0.7 0.7  --    TP

## 2020-11-11 NOTE — PAYOR COMM NOTE
--------------  ADMISSION REVIEW     Payor: [facility]  Subscriber #:  [id number]  Authorization Number: Lajune Reas    Admit date: 11/10/20  Admit time: 0006       Admitting Physician: Shree Beckford MD  Attending Physician:  Maura Arrieta Performed by Caroline Blevins MD at [facility] ENDOSCOPY   • HERNIA SURGERY  2008    bilateral inguinal hernia   • KNEE REPLACEMENT SURGERY Left 2015   • KNEE TOTAL REPLACEMENT Left 5/24/2017    Performed by Narda He MD at [facility]   • OTHER SURGICAL Abdomen: Soft on examination without tenderness to deep palpation or to percussion. No masses appreciated. Bowel sounds are normoactive. No CVA tenderness. Elevated BMI. Extremities: No cyanosis, no edema or clubbing. Pulses are +2.   Full range of mo CBC W/ DIFFERENTIAL[[id number]]          Abnormal            Final result                 Please view results for these tests on the individual orders.    TROPONIN I   URINALYSIS WITH CULTURE REFLEX   RAINBOW DRAW BLUE   RAINBOW DRAW LAVENDER   RAINBOW DRAW Patient presents with:  Difficulty Breathing       PCP: Carrol Cho MD      History of Present Illness: Patient is a 63year old male with PMH sig for obesity, SCARLET, DM2, severe obesity w complications presents for eval of hypoxia/covid pna.       covid pc •  aspirin 81 MG Oral Tab, Take 81 mg by mouth daily. •  Sildenafil Citrate (VIAGRA) 100 MG Oral Tab, Take as directed    •  Cholecalciferol (VITAMIN D) 1000 UNITS Oral Tab, Take 1 tablet by mouth daily.     •  B Complex Vitamins (VITAMIN B COMPLEX OR), Extremities: Extremities normal, atraumatic, no cyanosis or edema. Skin: Skin color, texture, turgor normal. No rashes or lesions.     Neurologic: Normal strength, no focal deficit appreciated     Data Review:    LABS:   Lab Results   Component Value Date DATE OF SERVICE: 11.07.2020 CT ANGIOGRAPHY CHEST CLINICAL INDICATION:  COVID-19. COMPARISON STUDY: None. TECHNIQUE: CT angiography of the chest was performed for evaluation of pulmonary embolism, after bolus infusion of 80 mL Isovue-370.  Coronal and sagitt IMPRESSION: 1. No radiographic evidence of pulmonary embolism. 2. Severe multifocal groundglass airspace disease, raising concern for viral pneumonia.  COVID-19 pneumonia imaging classification: Typical Commonly reported imaging features of COVID-19 pneumon Beni HOWARD Woody Patient Status:  Emergency    3/25/1960 MRN [id number]   Location [facility] [facility] Attending Brijesh Fraga MD   Hosp Day # 0 PCP Gal Bernal MD         Requested by Dr. Nathan Smith     Reason for Consultation:  Ivon- reports that he has never smoked. He has never used smokeless tobacco. He reports current alcohol use of about 2.0 standard drinks of alcohol per week.  He reports that he does not use drugs.     Allergies:  No Known Allergies     Medications:  No current 1638   * 104* 108*   BUN 19.0 22* 15   CREATSERUM 1.44* 1.70* 1.02   GFRAA  --  49* 91   GFRNAA  --  43* 79   CA  --  8.7 9.0   ALB 3.9 3.2* 2.9*    136 140   K 4.02 3.6 3.7    103 110   CO2 21.0* 24.0 24.0   ALKPHO 57 66 52   AST 52* 52 ASSESSMENT/PLAN:  1.  COVID-19 pneumonia day #12  Symptoms beginning 10/28, tested postiive on 10/30  Fever now resolved but had dyspnea, and desatruation with exertion  Currently 95% at rest     Unclear benefit to Remdesivir with symptoms x 12 days, resolu 1638 11/10/20  0628   ALT 34 38 40  --    AST 52* 52* 48*  --    ALB 3.9 3.2* 2.9*  --    LDH  --   --  468* 414*               Recent Labs   Lab 11/10/20  0021 11/10/20  0904 11/10/20  1159   PGLU 110* 187* 209*         Meds:   Scheduled:   • enoxaparin 11/11/2020 1206 Given 3 Units Subcutaneous (Left Upper Arm) Elsworth Patch, RN    11/11/2020 7055 Given 3 Units Subcutaneous (Left Lower Abdomen) Elsworth Patch, RN    11/10/2020 2108 Given 3 Units Subcutaneous (Left Upper Arm) Joleen Stokes RN

## 2020-11-11 NOTE — PLAN OF CARE
Problem: Patient/Family Goals  Goal: Patient/Family Long Term Goal  Description: Patient's Long Term Goal:   Discharge to home     Interventions:  - Follow plan of care   - See additional Care Plan goals for specific interventions  Outcome: Progressing assistance with activity based on assessment  - Modify environment to reduce risk of injury  - Provide assistive devices as appropriate  - Consider OT/PT consult to assist with strengthening/mobility  - Encourage toileting schedule  Outcome: Progressing Droplet/Contact isolation precaution. Pt is AOx4, VSS, afebrile and denies any pain. Weaned down O2 to RA at night. SpO2 maintained on low 90%. . IS-2000. Encouraged to sleep in prone position. Productive cough. Tele- NSR/SB . EP. Lovenox.  Cardiac/Car

## 2020-11-11 NOTE — PLAN OF CARE
Problem: Patient/Family Goals  Goal: Patient/Family Long Term Goal  Description: Patient's Long Term Goal:   Discharge to home     Interventions:  - Follow plan of care   - See additional Care Plan goals for specific interventions  Outcome: Progressing assistance with activity based on assessment  - Modify environment to reduce risk of injury  - Provide assistive devices as appropriate  - Consider OT/PT consult to assist with strengthening/mobility  - Encourage toileting schedule  Outcome: Progressing RA.  Will try an O2 walk in room today. Monitoring sugars. VSS. Encouraging IS and Ambulation. Will continue with plan of care.

## 2020-11-11 NOTE — PROGRESS NOTES
[facility] Hospitalist Team  Progress Note    Beni Mckay Patient Status:  Inpatient    3/25/1960 MRN [id number]   [facility] 5NW-A Attending Esther Chandler MD   Hosp Day # 1 PCP Earnest Sanches MD     CC: Follow Up  PCP: Earnest Sanches MD Collection Time: 11/11/20  7:37 AM   Result Value Ref Range    D-Dimer 0.77 (H) <0.61 ug/mL FEU   LDH    Collection Time: 11/11/20  7:37 AM   Result Value Ref Range     (H) 87 - 241 U/L   C-REACTIVE PROTEIN    Collection Time: 11/11/20  7:37 AM   Re B Complex Vitamins (VITAMIN B COMPLEX OR), Take 1 tablet by mouth daily.   , Disp: , Rfl:     •  Sildenafil Citrate (VIAGRA) 100 MG Oral Tab, Take as directed, Disp: 8 tablet, Rfl: 4            IMAGING     Ct Angiography, Chest (cpt=71275)    Result Date: 1 COVID-19 pneumonia are present. Other processes such as influenza pneumonia and organizing pneumonia, as can be seen with drug toxicity and connective tissue disease, can cause a similar imaging pattern.  Expert Consensus Statement on Reporting Chest CT ROSIE GEORGES [facility]

## 2020-11-12 VITALS
OXYGEN SATURATION: 94 % | DIASTOLIC BLOOD PRESSURE: 74 MMHG | BODY MASS INDEX: 37.58 KG/M2 | WEIGHT: 315 LBS | TEMPERATURE: 98 F | RESPIRATION RATE: 20 BRPM | HEART RATE: 61 BPM | HEIGHT: 76.77 IN | SYSTOLIC BLOOD PRESSURE: 131 MMHG

## 2020-11-12 PROCEDURE — 82962 GLUCOSE BLOOD TEST: CPT

## 2020-11-12 PROCEDURE — 86140 C-REACTIVE PROTEIN: CPT | Performed by: INTERNAL MEDICINE

## 2020-11-12 PROCEDURE — 85379 FIBRIN DEGRADATION QUANT: CPT | Performed by: INTERNAL MEDICINE

## 2020-11-12 PROCEDURE — 82728 ASSAY OF FERRITIN: CPT | Performed by: INTERNAL MEDICINE

## 2020-11-12 PROCEDURE — 83615 LACTATE (LD) (LDH) ENZYME: CPT | Performed by: INTERNAL MEDICINE

## 2020-11-12 PROCEDURE — 85025 COMPLETE CBC W/AUTO DIFF WBC: CPT | Performed by: INTERNAL MEDICINE

## 2020-11-12 NOTE — PROGRESS NOTES
SPO2% ON ROOM AIR AT REST 96%    SPO2% AMBULATION ON ROOM AIR 95%     SPO2% AMBULATION ON O2 95%  ON 0 LITERS PER MINIUTE

## 2020-11-12 NOTE — PLAN OF CARE
A/O x4. Vital signs stable. Tolerating RA with saturations of >92%. Tele-NSR. Voiding freely. No complaints of pain. Up self. IV decadron. Safety precautions in place. Pt updated on plan of care. WCTM.     Problem: Patient/Family Goals  Goal: Patient/Famil

## 2020-11-12 NOTE — PROGRESS NOTES
NURSING DISCHARGE NOTE    Discharged Home via Wheelchair. Accompanied by Family member  Belongings Taken by patient/family. Pt discharged to home. d/c instruction given to pt,verbalized understanding. PIV removed. pts family take him home.

## 2020-11-12 NOTE — DISCHARGE SUMMARY
[facility] Internal Medicine Discharge Summary   Patient ID:  Fanny Mckay  [id number]  63year old  3/25/1960    Admit date: 11/9/2020    Discharge date and time: 11/12/2020     Attending Physician: Yamila Tavarez MD     Primary Care Physician: Danyel Flores 11/12/20  1122   BP: 131/74   Pulse: 61   Resp: 20   Temp: 97.5 °F (36.4 °C)       Exam on day of discharge:  Gen: No acute distress, alert and oriented  CV: RRR, +s1/s2  Lungs: CTAB, good respiratory effort  Abdomen: s/nt/nd  Ext: Moves all 4 extremities, nodes are seen, such as a 12 x 10 mm in the right paratracheal node and a 13 x 11 mm subcarinal lymph node. Also noted are a few right hilar lymph nodes, largest of which measures 16 x 10 mm. No other mass lesions are identified.  No pericardial or pleural

## 2020-11-12 NOTE — PROGRESS NOTES
[facility]                INFECTIOUS DISEASE PROGRESS NOTE    Beni Mckay Patient Status:  Inpatient    3/25/1960 MRN [id number]   [facility] 5NW-A Attending Waldemar Moreno MD   Hosp Day # 2 PCP Valentín Echeverria MD 11/10/20  0628   * 104* 108* 172*   BUN 19.0 22* 15 14   CREATSERUM 1.44* 1.70* 1.02 0.90   GFRAA  --  49* 91 106   GFRNAA  --  43* 79 92   CA  --  8.7 9.0 8.6   ALB 3.9 3.2* 2.9*  --     136 140 140   K 4.02 3.6 3.7 4.1    103 110 111

## (undated) DEVICE — FORCEP BIOPSY RJ4 LG CAP W/ND

## (undated) DEVICE — DRAPE,U/SHT,SPLIT,FILM,60X84,STERILE: Brand: MEDLINE

## (undated) DEVICE — GOWN,SIRUS,FABRIC-REINFORCED,X-LARGE: Brand: MEDLINE

## (undated) DEVICE — ENDOSCOPY PACK - LOWER: Brand: MEDLINE INDUSTRIES, INC.

## (undated) DEVICE — SUTURE VICRYL 2-0 CP-1

## (undated) DEVICE — CONVERTORS STOCKINETTE: Brand: CONVERTORS

## (undated) DEVICE — KENDALL SCD EXPRESS SLEEVES, KNEE LENGTH, MEDIUM: Brand: KENDALL SCD

## (undated) DEVICE — SPECIMEN CONTAINER,POSITIVE SEAL INDICATOR, OR PACKAGED: Brand: PRECISION

## (undated) DEVICE — 3M™ COBAN™ NL STERILE NON-LATEX SELF-ADHERENT WRAP, 2084S, 4 IN X 5 YD (10 CM X 4,5 M), 18 ROLLS/CASE: Brand: 3M™ COBAN™

## (undated) DEVICE — WRAP COOLING KNEE W/ICE PILLOW

## (undated) DEVICE — SUTURE FIBERWIRE 2 AR-7202

## (undated) DEVICE — NON-ADHERENT STRIPS,OIL EMULSION: Brand: CURITY

## (undated) DEVICE — STERILE POLYISOPRENE POWDER-FREE SURGICAL GLOVES: Brand: PROTEXIS

## (undated) DEVICE — IRRISEPT WOUND DEBRIDMENT AND CLEANSING SYSTEM: Brand: IRRISEPT

## (undated) DEVICE — CHLORAPREP 26ML APPLICATOR

## (undated) DEVICE — ENDOSCOPY PACK UPPER: Brand: MEDLINE INDUSTRIES, INC.

## (undated) DEVICE — Device: Brand: DEFENDO AIR/WATER/SUCTION AND BIOPSY VALVE

## (undated) DEVICE — 1200CC GUARDIAN II: Brand: GUARDIAN

## (undated) DEVICE — 2T11 #2 PDO 36 X 36: Brand: 2T11 #2 PDO 36 X 36

## (undated) DEVICE — HOOD, PEEL-AWAY: Brand: FLYTE

## (undated) DEVICE — STERILE PATIENT PROTECTIVE PAD FOR IMP® KNEE POSITIONERS & COHESIVE WRAP (10 / CASE): Brand: DE MAYO KNEE POSITIONER®

## (undated) DEVICE — TOTAL KNEE CDS: Brand: MEDLINE INDUSTRIES, INC.

## (undated) DEVICE — 3M™ RED DOT™ MONITORING ELECTRODE WITH FOAM TAPE AND STICKY GEL, 50/BAG, 20/CASE, 72/PLT 2570: Brand: RED DOT™

## (undated) DEVICE — DRAIN RELIAVAC W/DRN M/L 3/16

## (undated) DEVICE — FILTERLINE NASAL ADULT O2/CO2

## (undated) DEVICE — GLOVE ALOETOUCH ORTHO SZ 8

## (undated) DEVICE — GLOVE SURG TRIUMPH SZ 8-1/2

## (undated) NOTE — IP AVS SNAPSHOT
[facility] [place] [address] [place], [address] ~ [phone]                Discharge Summary   6/18/2017    Beni Mckay           Admission Information        Provider Department    6/18/2017 Pauly Sidhu MD  3sw-A Clobetasol Propionate Emulsion 0.05 % Foam   Next dose due:  6/20/17 @ 9 pm          Apply bid to rash areas. Avoid face and scalp.     Jaelyn [place]                              docusate sodium 100 MG Caps   Common Pantoprazole Sodium 40 MG Tbec   Last time this was given:  40 mg on 6/20/2017  6:51 AM   Commonly known as:  PROTONIX   Next dose due:  6/21/17 @ 7 am          Take 1 tablet (40 mg total) by mouth every morning before breakfast.    Ekaterina Pacheco 0.9% NS flush 10 ml after each use. Dressing changes: Transparent dressing without gauze every 7 days or as needed, if soiled, wet, or non-occlusive. Apply heat to affective area as needed for discomfort.   RENATO REAL. Dr Donnell Goetz your physician fo FOLLOW UP with Pam Denton MD   [address] ([address] at [facility] )    [address] [place]  [address]            Apr 25, 2018  9:00 AM   EXAM-ESTABLISHED with MICHELLE/ADALBERTO Webb 1.40 (06/15/17)  0.47 (06/15/17)  0.25 (06/15/17)  7.35      Metabolic Lab Results  (Last result in the past 90 days)    HgbA1C Glucose BUN Creatinine Calcium Alkaline Phosph AST    (06/18/17)  6.5 (H) (06/19/17)  76 (06/19/17)  11 (06/19/17)  1.11 (06/19/ discharge instructions in [place] by going to Visits < Admission Summaries. If you've been to the Emergency Department or your doctor's office, you can view your past visit information in [facility] by going to Visits < Visit Summaries. iGo questions? Use: Lower cholesterol, protect your heart   Most common side effects: Dizziness, constipation, abnormal liver function   What to report to your healthcare team:  Dizziness, muscle aches, constipation           Blood Producing Medications     Ferrous Sulf Use:  Treatment of asthma, COPD/emphysema, cough, allergies   Most common side effects: Headache, nasal irritation, palpitations, increased heart rate, increased blood pressure, allergic reaction, yeast infection of the mouth/tongue   What to report to yo

## (undated) NOTE — LETTER
[facility]  Stefano Espinosa 60 [address] [facility], [address] [place] [address]    Consent for Operation    Date: __________________    Time: _______________    1.  I authorize the performance upon Beni Mckay the following operation:    Procedure(s):  IRRIGTATION AND DE procedure has been videotaped, the surgeon will obtain the original videotape. The hospital will not be responsible for storage or maintenance of this tape.     6. For the purpose of advancing medical education, I consent to the admittance of observers to t STATEMENTS REQUIRING INSERTION OR COMPLETION WERE FILLED IN.     Signature of Patient:   ___________________________    When the patient is a minor or mentally incompetent to give consent:  Signature of person authorized to consent for patient: ____________ drugs/illegal medications). Failure to inform my anesthesiologist about these medicines may increase my risk of anesthetic complications. · If I am allergic to anything or have had a reaction to anesthesia before.     3. I understand how the anesthesia med I have discussed the procedure and information above with the patient (or patient’s representative) and answered their questions. The patient or their representative has agreed to have anesthesia services.     _______________________________________________

## (undated) NOTE — IP AVS SNAPSHOT
[facility] [place]  [address] [place], [address] ~ [phone]                Discharge Summary   6/13/2017    Beni Mckay           Admission Information        Provider Department    6/13/2017 ISMAEL QUISPE DO  3ne-A Commonly known as:  COLACE        Take 100 mg by mouth 2 (two) times daily. Stephanie Cassette                              Empagliflozin 25 MG Tabs   Commonly known as:  JARDIANCE        Take 25 mg by mouth daily.    Stop taking on:  7/6/2017    Álvaro Tong Triamcinolone Acetonide 55 MCG/ACT Aero   Commonly known as:  NASACORT        3 sprays by Nasal route daily.                             VICTOZA 18 MG/3ML Sopn   Generic drug:  Liraglutide        INJECT 1.8MG UNDER THE SKIN DAILY    Harriet Kan [address] ([place] at [facility] [address] [place] [address] Drive   [phone]            Apr 25, 2018  9:00 AM   EXAM-ESTABLISHED with MICHELLE/ADALBERTO [facility] ([facility] AT [address] [place] [address])    [address] [place] [address] 0.68 (H) (06/14/17)  0.08 (06/14/17)  7.51      Metabolic Lab Results  (Last result in the past 90 days)    HgbA1C Glucose BUN Creatinine Calcium Alkaline Phosph AST    (06/14/17)  7.0 (H) (06/14/17)  117 (H) (06/14/17)  33 (H) (06/14/17)  0.98 (06/14/17) discharge instructions in [facility] by going to Visits < Admission Summaries. If you've been to the Emergency Department or your doctor's office, you can view your past visit information in [facility] by going to Visits < Visit Summaries. [employer] questions? Use: Lower cholesterol, protect your heart   Most common side effects: Dizziness, constipation, abnormal liver function   What to report to your healthcare team:  Dizziness, muscle aches, constipation           Blood Sugar Medications     glimepiride 4 MG mouth/tongue, shortness of breath, sensation of heart racing, rash, or itching           All Other Medications     sodium chloride 0.9 % SOLN 100 mL with penicillin G potassium [id number] units SOLR 4 Million Units    Clobetasol Propionate Emulsion 0.05 % Ex

## (undated) NOTE — IP AVS SNAPSHOT
Patient Demographics     Address Phone E-mail Address    [address] [facility] [address] [facility] [id number] [address] [facility])  [phone] (Work)  [phone] [facility]) [email or web address]      Emergency Contact(s)     Name [address] [place] [address] Commonly known as:  LIPITOR   Next dose due:  6/20/17 @ 9 pm            TAKE 1 BY MOUTH DAILY    Regan, Candance Cos                           CYRUS MICROLET LANCETS Misc        Check 4 times daily    Travis Bearden                                 Clobetasol Propion Travis Bearden                              NOVOTWIST 32G X 5 MM Misc   Generic drug:  Insulin Pen Needle        USE 2 TIMES DAILY    Travis Bearden                              LANTUS SOLOSTAR 100 UNIT/ML Sopn   Generic drug:  Insulin Glargine   Next dose due: Last time this was given:  1,000 Units on 6/20/2017  8:16 AM   Next dose due:  6/21/17 @ 9 am          Take 1 tablet by mouth daily.                                  Where to Get Your Medications      Please  your prescriptions at the location direct LEFT UPPER ARM     Order ID Medication Name Action Time Action Reason Comments    [id number] insulin aspart (NOVOLOG) 100 UNIT/ML flexpen 1-5 Units 06/20/17 1202 Given                    Recent Vital Signs       Most Recent Value    Vitals 129/74 mmHg File 2059    Order Status:  Completed Lab Status:  Final result Updated:  06/20/17 1138    Specimen Information:  Other from Cath tip picc      Catheter Tip Culture >15 CFU Candida albicans (A)          H&P - H&P Note      H&P by Rickie Chavez MD at 6/19/2017 Comment arthroscopies left knee    KNEE REPLACEMENT SURGERY Left 2015    COLONOSCOPY  2015    Comment Wei        ALL:  No Known Allergies     Home Medications:    Outpatient Prescriptions Marked as Taking for the 6/18/17 encounter [facility] Encounter): B Complex Vitamins (VITAMIN B COMPLEX OR) 1 tablet daily. Disp:  Rfl:    MULTIPLE VITAMINS OR Take 1 tablet by mouth daily.  Disp:  Rfl:          Soc Hx     Smoking status: Never Smoker     Smokeless tobacco: Never Used    Alcohol Use: Yes  1.2 oz/week    2 No results for input(s): TROP in the last 72 hours.       Radiology: none    ASSESSMENT / PLAN:     PICC line site infection  - ID following  - UE u/s ordered  - cont PCN IV  - await picc tip culture, site does not currently appear infected now that picc ha Location [address] [place] [address] 3SW-A Attending Vania Bojorquez MD   [facility] Day # 1 PCP Maddie Ang MD     Requested by Dr. Chyna Haque    Reason for Consultation:  Left Knee prosthetic joint infection  Possible picc infection    History of Present Illness:  Beni MOTA he does not use illicit drugs.     Allergies:  No Known Allergies    Medications:    Current facility-administered medications:   •  insulin detemir (LEVEMIR) 100 UNIT/ML flextouch 20 Units, 20 Units, Subcutaneous, Nightly  •  ondansetron HCl (ZOFRAN) injec Cardiovascular: S1, S2.  Regular rate and rhythm. No murmurs. Abdomen: Soft, nontender, nondistended. Positive bowel sounds.   Left arm old picc site, indurated, no drainage no erythema  Musculoskeletal: left knee incision, closed, steristrips, non tende SP I/D 5/24/17  -attempt at salvage, 6 weeks iv pcn, followed by po  CRP on 6/12 was still elevated at 3.37mg/dl - knee itself appears improved  PICC line removed for drainage, will need to be replaced  Low suspicion for bacteremia given lack of fever, and autoPAP 12-20 C[facility]   • HYPERLIPIDEMIA    • BACK PAIN    • Osteoarthritis    • Sleep apnea        Past Surgical History      Past Surgical History    HERNIA SURGERY  2008    Comment bilateral inguinal hernia    TONSILLECTOMY      OTHER SURGICAL HIS -   Sitting down on and standing up from a chair with arms (e.g., wheelchair, bedside commode, etc.): None   -   Moving from lying on back to sitting on the side of the bed?: None   How much help from another person does the patient currently need. ..   - Patient has been evaluated and presents with no skilled Physical Therapy needs at this time. Patient discharged from Physical Therapy services. Please re-order if a new functional limitation presents during this admission.     GOALS  Patient was able to a Past Surgical History      Past Surgical History    HERNIA SURGERY  2008    Comment bilateral inguinal hernia    TONSILLECTOMY      OTHER SURGICAL HISTORY      Comment cts surgery no help lefty    OTHER SURGICAL HISTORY      Comment septoplasty x2    OTHER -   Toileting, which includes using toilet, bedpan or urinal? : None  -   Putting on and taking off regular upper body clothing?: None  -   Taking care of personal grooming such as brushing teeth?: None  -   Eating meals?: None    AM-PAC Score:  Score: 24 No notes of this type exist for this encounter.             Immunizations     Name Date      Gel-one, Intra-articular 05/12/14     Gel-one, Intra-articular 11/19/13     Gel-one, Intra-articular 04/12/13     INFLUENZA 09/19/16     INFLUENZA 11/26/14     INF

## (undated) NOTE — IP AVS SNAPSHOT
[facility] [place] [address] [place], [address] ~ [phone]                Discharge Summary   6/13/2017    Beni Mckay           Admission Information        Provider Department    6/13/2017 Indira Galeano MD  3ne-HOWARD Delgado Lax Take 1 tablet (4 mg total) by mouth 2 (two) times daily.     Piotr Philip     [    ]    [    ]    [    ]    [    ]       Glucose Blood Strp   Commonly known as:  CYRUS CONTOUR NEXT TEST        Pt tests 4 times daily    Piotr Philip     [    ]    [    ]    [ VITAMIN B COMPLEX OR        1 tablet daily. [    ]    [    ]    [    ]    [    ]       Vitamin D 1000 units Tabs        Take 1 tablet by mouth daily.       [    ]    [    ]    [    ]    [    ]               Instructions and Information about Your Heal 58.5 (06/15/17)  26.5 (06/15/17)  8.9 (06/15/17)  4.7 (06/15/17)  0.6 -- (06/15/17)  3.09 (06/15/17)  1.40 (06/15/17)  0.47 (06/15/17)  0.25 (06/15/17)  0.03    (06/14/17)  62.7 (06/14/17)  26.8 (06/14/17)  8.8 (06/14/17)  0.8 (06/14/17)  0.3  (06/14/17) 6/13/2017 11:56 PM  3ne-A [phone]      Reason for Visit     Dyspnea PAVITHRA SOB (respiratory)              We want to hear from you       We want to hear from you, please share your experience with us by returning the survey you will receive in the [facility]

## (undated) NOTE — IP AVS SNAPSHOT
[facility] [place] [address] [place], [address] ~ [phone]                Discharge Summary   5/22/2017    Beni Mckay           Admission Information        Provider Department    5/22/2017 DO Gurvinder Stringer 3sw-A Inject 4 Million Units into the vein every 4 (four) hours.    Stop taking on:  6/28/2017    Bailey Diamond taking these medications        Instructions Authorizing Provider    Morning Afternoon Evening As Needed    daisy Vitamin D 1000 units Tabs        Take 1 tablet by mouth daily.                               STOP taking these medications     ASPIR-81 OR           Diclofenac Sodium 75 MG Tbec   Commonly known as:  VOLTAREN                Where to Get Your Medications [address] ([place] at [address] [place] [address])    [address] [place] [address]             Jun 06, 2017  4:00 PM   FOLLOW UP with Alberto Urbano MD   [address] ([address] [place] at [place] [address] 0.2 -- (05/24/17)  7.69 (H) (05/24/17)  1.21 (05/24/17)  0.93 (H) (05/24/17)  0.03 (05/24/17)  0.02    (05/22/17)  88.2 (05/22/17)  3.9 (05/22/17)  7.1 (05/22/17)  0.0 (05/22/17)  0.2  (05/22/17)  10.07 (H) (05/22/17)  0.44 (L) (05/22/17)  0.81 (H) (05/22/ - If you don’t have insurance, Seun Khanna has partnered with [facility] to help you get signed up for insurance coverage.   [facility] is a Federal Navigator program that can help with your Affordable Care Act cover What to report to your healthcare team: Tolerance of medications, temperature, rash, itching, shortness of breath, chills, nausea, and diarrhea           Blood Pressure and Cardiac Medications     Sildenafil Citrate (VIAGRA) 100 MG Oral Tab         Use: Tr no bowel movement in 2+ days, unresolved pain           GI Medications     docusate sodium 100 MG Oral Cap       Use:  Nausea/vomiting, acid reflux, low bowel motility, stomach pain   Most common side effects:  Depends on the specific medication but genera

## (undated) NOTE — LETTER
[facility]  Stefano Espionsa 60 [address] [facility], [address] [place] [address]    Consent for Operation    Date: __________________    Time: _______________    1.  I authorize the performance upon Beni Mckay the following operation:    EGD and Colonoscopy under monitor videotape. [facility] [place] will not be responsible for storage or maintenance of this tape. 6. For the purpose of advancing medical education, I consent to the admittance of observers to the Operating Room.     7. I authorize the use of any specimen, organs Signature of Patient:   ___________________________    When the patient is a minor or mentally incompetent to give consent:  Signature of person authorized to consent for patient: ___________________________   Relationship to patient: _____________________ drugs/illegal medications). Failure to inform my anesthesiologist about these medicines may increase my risk of anesthetic complications. · If I am allergic to anything or have had a reaction to anesthesia before.     3. I understand how the anesthesia med I have discussed the procedure and information above with the patient (or patient’s representative) and answered their questions. The patient or their representative has agreed to have anesthesia services.     _______________________________________________